# Patient Record
Sex: FEMALE | Race: WHITE | URBAN - METROPOLITAN AREA
[De-identification: names, ages, dates, MRNs, and addresses within clinical notes are randomized per-mention and may not be internally consistent; named-entity substitution may affect disease eponyms.]

---

## 2022-06-01 ENCOUNTER — TELEMEDICINE (OUTPATIENT)
Dept: RHEUMATOLOGY | Age: 52
End: 2022-06-01
Payer: COMMERCIAL

## 2022-06-01 DIAGNOSIS — G62.9 NEUROPATHY: ICD-10-CM

## 2022-06-01 DIAGNOSIS — M06.4 INFLAMMATORY POLYARTHRITIS (HCC): Primary | ICD-10-CM

## 2022-06-01 DIAGNOSIS — R89.4 SEROLOGIC ABNORMALITY: ICD-10-CM

## 2022-06-01 DIAGNOSIS — E55.9 HYPOVITAMINOSIS D: ICD-10-CM

## 2022-06-01 DIAGNOSIS — Z79.52 LONG TERM (CURRENT) USE OF SYSTEMIC STEROIDS: ICD-10-CM

## 2022-06-01 DIAGNOSIS — Z79.899 HIGH RISK MEDICATION USE: ICD-10-CM

## 2022-06-01 DIAGNOSIS — R76.0 ANTINUCLEAR ANTIBODY (ANA) TITER GREATER THAN 1:80: ICD-10-CM

## 2022-06-01 DIAGNOSIS — R53.83 OTHER FATIGUE: ICD-10-CM

## 2022-06-01 DIAGNOSIS — Z79.899 LONG TERM CURRENT USE OF IMMUNOSUPPRESSIVE DRUG: ICD-10-CM

## 2022-06-01 DIAGNOSIS — M54.16 RADICULOPATHY, LUMBAR REGION: ICD-10-CM

## 2022-06-01 DIAGNOSIS — D64.9 ANEMIA, UNSPECIFIED TYPE: ICD-10-CM

## 2022-06-01 PROCEDURE — 99215 OFFICE O/P EST HI 40 MIN: CPT | Performed by: INTERNAL MEDICINE

## 2022-06-01 RX ORDER — IBUPROFEN 400 MG/1
400 TABLET ORAL EVERY 8 HOURS PRN
COMMUNITY
Start: 2021-03-02 | End: 2022-08-31 | Stop reason: SINTOL

## 2022-06-01 RX ORDER — PANTOPRAZOLE SODIUM 40 MG/1
TABLET, DELAYED RELEASE ORAL
COMMUNITY
Start: 2021-03-21

## 2022-06-01 RX ORDER — PREGABALIN 50 MG/1
CAPSULE ORAL
Qty: 90 CAPSULE | Refills: 0 | Status: SHIPPED | OUTPATIENT
Start: 2022-06-01 | End: 2022-07-13 | Stop reason: SDUPTHER

## 2022-06-01 RX ORDER — HYDROCHLOROTHIAZIDE 25 MG/1
TABLET ORAL
COMMUNITY
Start: 2021-05-10

## 2022-06-01 RX ORDER — VENLAFAXINE HYDROCHLORIDE 150 MG/1
150 CAPSULE, EXTENDED RELEASE ORAL DAILY
Qty: 30 CAPSULE | Refills: 0 | Status: SHIPPED | OUTPATIENT
Start: 2022-06-01 | End: 2022-07-13 | Stop reason: SDUPTHER

## 2022-06-01 RX ORDER — AZATHIOPRINE 50 MG/1
150 TABLET ORAL DAILY
Qty: 30 TABLET | Refills: 1 | Status: SHIPPED | OUTPATIENT
Start: 2022-06-01 | End: 2022-08-31

## 2022-06-01 RX ORDER — PREDNISONE 1 MG/1
TABLET ORAL
Qty: 30 TABLET | Status: CANCELLED | OUTPATIENT
Start: 2022-06-01

## 2022-06-01 NOTE — PROGRESS NOTES
Alyssa Tran is a 47 y.o. female who was seen by synchronous (real-time) audio-video technology. Consent:  She and/or her healthcare decision maker is aware that this patient-initiated Telehealth encounter is a billable service, with coverage as determined by her insurance carrier. She is aware that she may receive a bill and has provided verbal consent to proceed: Yes    I was at home while conducting this encounter. Subjective:           History of Present Illness  Permanent History: Mrs dixon is a very pleasant 52Caucasian lady-year-old lady with past medical history of chronic pain, Sjogren's syndrome, GERD, anemia who presents for evaluation of her Sjogren's. Patient previously seeing dr Vin Badillo, for longstanding has several pain complaints, from bilateral shoulder and knee pain in addition to hands, no swelling or redness -was treated with tramadol - and takes it approximately once or twice daily. She was first diagnosed with Sjogren's many years ago when she had elevated serologies in addition to multiple pain complaints. Her anemia has been pretty much constant for the past few years at one time dropping so low she required iron infusions. She has been seen by Dr. Hector Edwards in the past with a colonoscopy that was negative., Patient was seen by me for a few years until my move has been followed locally since then. \"Update since then has been fairly quiescent symptoms for a couple of years but more recently increasing pain, stiffness, especially in the mornings with tingling numbness in the hands and feet. Also significant increasing pain in neck, was referred to surgeon underwent shots in the neck and lower back. Spinal surgeon referred to pain management. Neck and back pain and hand pain. Hands hurt all the time. Will wake up in the middle of the night with hand drawing. Seeing foot doctor, getting shots in feet cortisone, which does help for a few weeks.  Feet hurt when she walks, has a sharp shooting pain that goes up the legs and into back. Was thinking about going to chiropactor but scared. Has really bad stiffness in the morning and anytime she stands up when she is sittingl having heel soreness. For the past year, this pain is getting gradually worse. the owrst pain at ngiht and first thing in the morning. Not taking plaquneil. Just taking lyrica - takes 100mg at night- cant take during the day bc ot makes her sleepy. Tried gabapentin but it didn't help- was takingit at night. Works on her computer and having to stop bc of finger pain. Everything is tender. Taken steroids for feet, the foot doc, givne injectiosn and orally. Feels better when she takes sterods. Took 1 tab a day as needed for steroids, helped while she was taking it. Shooting pain down neck and into arms. Last period 2 years ago- done with menopause. Had to stop taking med for dryness bc it gave her hot flashes while she was in menopause. Sees cardio for heart- every 2 years- for htcz. Takes ibuprofen for pain. Takes tylenol, advil and rotates them- not at the same time. Takes vit d daily. Been taking effector for about a year bc of menopasue. Given prednisone by foot doc. Never smoked. Doesn't drink. \"    Family history significant for mother with Raynaud's disease      Since Last Visit,     Pt is currently taking  Lyrica 50mg in the am [id number] at night and Vitamin D every day, she is not taking the prednisone I makes her feel jittery, but she tolerating imuran. and helping some. The  pain in hands and feet are worse. Labs are in epic. ROS:     Musculoskeletal ROS:   .    Abnormal:  joint pain, joint swelling, back pain, stiff muscles, weak muscles and painful muscles  . AM stiffness (hours): 3 hours  . Pain scale (0-10): 6  . Fatigue scale (0-10): 8  .    Job status: working   .     Activities of daily living: some difficulty    positive as above with the addition of   Dry mouth, pedal edema, constipation, GERD symptoms,   Otherwise negative for:  Fevers, chills or sweats. Headaches  Weight  stable  No  scalp tenderness. No jaw claudication. No acute visual changes. No red or dry eyes. No auditory complaints. No nasal discharge or rhinorrhea. No oral lesions or ulcers. No sore throat. No tongue pain. No cough. No shortness of breath, dyspnea on exertion or wheezing. No hemoptysis. No chest pains or palpitations. No lightheadedness. No abdominal pain,diarrhea . No increased urinary frequency, nocturia, dysuria or changes in urine color. No alopecia, skin rashes/lesions. No changes in skin tightness. No Raynaud's. Photosensitivity. Current Outpatient Medications:     azaTHIOprine (Imuran) 50 mg tablet, Take 1 Tablet by mouth daily. , Disp: 90 Tablet, Rfl: 0    pregabalin (Lyrica) 50 mg capsule, Continue lyrica  50mg in am and 200mg in pm - can skip am dose if too sleepy, Disp: 150 Capsule, Rfl: 2    acetaminophen (Tylenol Extra Strength) 500 mg tablet, Take  by mouth every six (6) hours as needed for Pain., Disp: , Rfl:     hydroCHLOROthiazide (HYDRODIURIL) 25 mg tablet, , Disp: , Rfl:     pantoprazole (PROTONIX) 40 mg tablet, , Disp: , Rfl:     predniSONE (DELTASONE) 5 mg tablet, 5mg daily as needed (Patient not taking: Reported on 4/14/2022), Disp: 90 Tablet, Rfl: 0    venlafaxine-SR (EFFEXOR-XR) 75 mg capsule, , Disp: , Rfl:     Allergies   Allergen Reactions    Daypro [Oxaprozin] Swelling       There is no problem list on file for this patient. Past Medical History:   Diagnosis Date    Anemia     Cervical radiculopathy     Sjogren's disease ([facility] [address]        History reviewed. No pertinent surgical history. History reviewed. No pertinent family history.     Social History     Socioeconomic History    Marital status: SINGLE   Tobacco Use    Smoking status: Never Smoker    Smokeless tobacco: Never Used   Substance and Sexual Activity    Alcohol use: Not Currently Objective:          PHYSICAL EXAMINATION:     There were no vitals taken for this visit. General: alert, cooperative, no distress, Appears well-developed and well-nourished   HENT: Normocephalic, atraumatic   Mental  status: mental status: alert, oriented to person, place, and time, normal mood, behavior, speech, dress, motor activity, and thought processes   Resp: resp: normal effort and no respiratory distress   Neuro: neuro: no gross deficits - No Facial Asymmetry (Cranial nerve 7 motor function) (limited exam due to video visit)    Skin: skin: no discoloration or lesions of concern on visible areas   MSK; Normal gait with no signs of ataxia, Normal range of motion of neck     Due to this being a TeleHealth evaluation, many elements of the physical examination are unable to be assessed. Assessment:       67-year-old very pleasant  lady with past medical history of chronic pain, Sjogren's syndrome, GERD, anemia who presents for f/u of her Sjogren's - high SSA/SSB, mitochondrial antibodies. And REA. Previously stable on gabapentin, when necessary tramadol, and Plaquenil, however off Plaquenil for past few years. Clinical picture for few years fairly noninflammatory, however now looking more like inflammatory polyarthritis, much worse in the past year , dramatic response to steroids, last visit started steroid sparing agent with methotrexate, tolerating unclear significant improvement, called us a couple weeks ago for acute flare and we had called in a prednisone taper which helped. Failed methotrexate as close significant mouth sores and hair loss, now on Imuran tolerating slow improvement in symptoms can increase further as below. We will taper off prednisone fully. Mild elevated mitochondrial antibodies, and SSA- is followed by GI.     Mild anemia - Was seen by hematology, iron deficient given IV iron improved stable now    On replacement vit d               Treatment plan was discussed in detail with patient. Agreement and understanding of the plan was verbalized by the patient. Total visit time 44 minutes, greater than half of the time was spent on counseling. Plan:         1. Labs - cbc, cmp,  before next visit    2. Increase imuran to 150mg daily    3. Prednisone 5mg daily as needed - min use  4. Continue lyrica  50mg in am and 150mg in pm   5. effexor  150mg daily for now   6. Tylenol 1000mg every 6 hrs as needed for pain   6. RTC in  6 weeks  - call if any issues                         CPT Codes 29686-89773 for Established Patients may apply to this Telehealth Visit  Total visit time  45 minutes , greater than half of the time was spent on counseling. We discussed the expected course, resolution and complications of the diagnosis(es) in detail. Medication risks, benefits, costs, interactions, and alternatives were discussed as indicated. I advised her to contact the office if her condition worsens, changes or fails to improve as anticipated. She expressed understanding with the diagnosis(es) and plan. Pursuant to the emergency declaration under the [facility] waiver authority and the [employer] General Act, this Virtual  Visit was conducted, with patient's consent, to reduce the patient's risk of exposure to COVID-19 and provide continuity of care for an established patient. Services were provided through a video synchronous discussion virtually to substitute for in-person clinic visit.     Tia Daniel MD

## 2022-07-13 ENCOUNTER — OFFICE VISIT (OUTPATIENT)
Dept: RHEUMATOLOGY | Age: 52
End: 2022-07-13
Payer: COMMERCIAL

## 2022-07-13 VITALS
WEIGHT: 133 LBS | DIASTOLIC BLOOD PRESSURE: 94 MMHG | SYSTOLIC BLOOD PRESSURE: 156 MMHG | BODY MASS INDEX: 24.33 KG/M2 | HEART RATE: 73 BPM

## 2022-07-13 DIAGNOSIS — M06.4 INFLAMMATORY POLYARTHRITIS (HCC): ICD-10-CM

## 2022-07-13 DIAGNOSIS — R53.83 OTHER FATIGUE: ICD-10-CM

## 2022-07-13 DIAGNOSIS — G62.9 NEUROPATHY: ICD-10-CM

## 2022-07-13 DIAGNOSIS — R89.4 SEROLOGIC ABNORMALITY: ICD-10-CM

## 2022-07-13 DIAGNOSIS — R76.0 ANTINUCLEAR ANTIBODY (ANA) TITER GREATER THAN 1:80: ICD-10-CM

## 2022-07-13 DIAGNOSIS — Z79.899 HIGH RISK MEDICATION USE: ICD-10-CM

## 2022-07-13 DIAGNOSIS — M54.16 RADICULOPATHY, LUMBAR REGION: ICD-10-CM

## 2022-07-13 DIAGNOSIS — D64.9 ANEMIA, UNSPECIFIED TYPE: ICD-10-CM

## 2022-07-13 DIAGNOSIS — E55.9 HYPOVITAMINOSIS D: ICD-10-CM

## 2022-07-13 DIAGNOSIS — Z79.52 LONG TERM (CURRENT) USE OF SYSTEMIC STEROIDS: ICD-10-CM

## 2022-07-13 DIAGNOSIS — Z79.899 LONG TERM CURRENT USE OF IMMUNOSUPPRESSIVE DRUG: ICD-10-CM

## 2022-07-13 PROCEDURE — 99215 OFFICE O/P EST HI 40 MIN: CPT | Performed by: INTERNAL MEDICINE

## 2022-07-13 RX ORDER — VENLAFAXINE HYDROCHLORIDE 150 MG/1
150 CAPSULE, EXTENDED RELEASE ORAL DAILY
Qty: 90 CAPSULE | Refills: 0 | Status: SHIPPED | OUTPATIENT
Start: 2022-07-13 | End: 2022-08-31 | Stop reason: SDUPTHER

## 2022-07-13 RX ORDER — PREGABALIN 50 MG/1
CAPSULE ORAL
Qty: 360 CAPSULE | Refills: 0 | Status: SHIPPED | OUTPATIENT
Start: 2022-07-13 | End: 2022-08-31 | Stop reason: SDUPTHER

## 2022-07-13 NOTE — PROGRESS NOTES
Subjective:           History of Present Illness  Permanent History: Mrs dixon is a very pleasant 47  lady-year-old lady with past medical history of chronic pain, Sjogren's syndrome, GERD, anemia who presents for evaluation of her Sjogren's. Patient previously seeing dr Bulmaro Reyes, for longstanding has several pain complaints, from bilateral shoulder and knee pain in addition to hands, no swelling or redness -was treated with tramadol - and takes it approximately once or twice daily. She was first diagnosed with Sjogren's many years ago when she had elevated serologies in addition to multiple pain complaints. Her anemia has been pretty much constant for the past few years at one time dropping so low she required iron infusions. She has been seen by Dr. Marleny Burton in the past with a colonoscopy that was negative., Patient was seen by me for a few years until my move has been followed locally since then. \"Update since then has been fairly quiescent symptoms for a couple of years but more recently increasing pain, stiffness, especially in the mornings with tingling numbness in the hands and feet. Also significant increasing pain in neck, was referred to surgeon underwent shots in the neck and lower back. Spinal surgeon referred to pain management. Neck and back pain and hand pain. Hands hurt all the time. Will wake up in the middle of the night with hand drawing. Seeing foot doctor, getting shots in feet cortisone, which does help for a few weeks. Feet hurt when she walks, has a sharp shooting pain that goes up the legs and into back. Was thinking about going to chiropactor but scared. Has really bad stiffness in the morning and anytime she stands up when she is sittingl having heel soreness. For the past year, this pain is getting gradually worse. the owrst pain at ngiht and first thing in the morning. Not taking plaquneil.  Just taking lyrica - takes 100mg at night- cant take during the day bc ot makes her sleepy. Tried gabapentin but it didn't help- was takingit at night. Works on her computer and having to stop bc of finger pain. Everything is tender. Taken steroids for feet, the foot doc, givne injectiosn and orally. Feels better when she takes sterods. Took 1 tab a day as needed for steroids, helped while she was taking it. Shooting pain down neck and into arms. Last period 2 years ago- done with menopause. Had to stop taking med for dryness bc it gave her hot flashes while she was in menopause. Sees cardio for heart- every 2 years- for htcz. Takes ibuprofen for pain. Takes tylenol, advil and rotates them- not at the same time. Takes vit d daily. Been taking effector for about a year bc of menopasue. Given prednisone by foot doc. Never smoked. Doesn't drink. \"    Family history significant for mother with Raynaud's disease      Since Last Visit,     Pt is currently taking  Lyrica 50mg in the am [id number] at night and Vitamin D 2000 every day, Imuran 150mg qd. Not on Pred. Worst pain hands,feet,neck,back and arm- rt side worse. Labs are in Care. Pt staes last 3 weeks having stomach pain,belching, cant eat good, going to GI - Dr Willem Yeboah  in 3 weeks. Pt also states she has also been really depressed. ROS:     Musculoskeletal ROS:   .    Abnormal:  joint pain, joint swelling, back pain, neck painstiff muscles, weak muscles and painful muscles  . AM stiffness (hours): 2 hours  . Pain scale (0-10): 6  . Fatigue scale (0-10): 5  .    Job status: working   . Activities of daily living: some difficulty    positive as above with the addition of   Dry mouth, pedal edema, constipation, GERD symptoms,   Otherwise negative for:  Fevers, chills or sweats. Headaches  Weight  stable  No  scalp tenderness. No jaw claudication. No acute visual changes. No red or dry eyes. No auditory complaints. No nasal discharge or rhinorrhea. No oral lesions or ulcers. No sore throat. No tongue pain. No cough.  No shortness of breath, dyspnea on exertion or wheezing. No hemoptysis. No chest pains or palpitations. No lightheadedness. No abdominal pain,diarrhea . No increased urinary frequency, nocturia, dysuria or changes in urine color. No alopecia, skin rashes/lesions. No changes in skin tightness. No Raynaud's. Photosensitivity. Current Outpatient Medications:     azaTHIOprine (Imuran) 50 mg tablet, Take 1 Tablet by mouth daily. , Disp: 90 Tablet, Rfl: 0    pregabalin (Lyrica) 50 mg capsule, Continue lyrica  50mg in am and 200mg in pm - can skip am dose if too sleepy, Disp: 150 Capsule, Rfl: 2    acetaminophen (Tylenol Extra Strength) 500 mg tablet, Take  by mouth every six (6) hours as needed for Pain., Disp: , Rfl:     hydroCHLOROthiazide (HYDRODIURIL) 25 mg tablet, , Disp: , Rfl:     pantoprazole (PROTONIX) 40 mg tablet, , Disp: , Rfl:     predniSONE (DELTASONE) 5 mg tablet, 5mg daily as needed (Patient not taking: Reported on 4/14/2022), Disp: 90 Tablet, Rfl: 0    venlafaxine-SR (EFFEXOR-XR) 75 mg capsule, , Disp: , Rfl:     Allergies   Allergen Reactions    Daypro [Oxaprozin] Swelling       There is no problem list on file for this patient. Past Medical History:   Diagnosis Date    Anemia     Cervical radiculopathy     Sjogren's disease ([facility] [address]        History reviewed. No pertinent surgical history. History reviewed. No pertinent family history.     Social History     Socioeconomic History    Marital status: SINGLE   Tobacco Use    Smoking status: Never Smoker    Smokeless tobacco: Never Used   Substance and Sexual Activity    Alcohol use: Not Currently     Objective:         Vitals:    07/13/22 1143   BP: (!) 156/94   Pulse: 73        Physical Exam  General: alert, healthy, well nourished, pleasant, no acute distress  HEENT: Some dry mucous membranes, no oral ulcers  Lungs: clear to auscultation bilaterally without wheezes, rales or rhonchi  Heart: regular rate & rhythm, +S1, +S2, no murmurs  Extremities: no clubbing, cyanosis, or edema  Neurological mood focal deficits conversing fluently. MUSCULOSKELETAL:   -Hands: some diffused TTP throughout mcp , pip, no synovitis   -Wrists: Persistent tenderness bilaterally but no swelling or fullness today  -Elbows: normal   -Shoulders: Slow but full range of motion due to pain  -Neck: normal   -Back:point tenderness of the low back   -Hips: normal   -Knees: normal   -Ankles: Bilateral tenderness  -Feet: Bilateral tenderness    -Swollen Joint Count: 0  -Tender Joint Count: 6        Assessment:       58-year-old very pleasant  lady with past medical history of chronic pain, Sjogren's syndrome, GERD, anemia who presents for f/u of her Sjogren's - high SSA/SSB, mitochondrial antibodies. And REA. Previously stable on gabapentin, when necessary tramadol, and Plaquenil, however off Plaquenil for past few years. Clinical picture for few years fairly noninflammatory, however now looking more like inflammatory polyarthritis, much worse in the past year , dramatic response to steroids, last visit started steroid sparing agent with methotrexate, tolerating unclear significant improvement, called us a couple weeks ago for acute flare and we had called in a prednisone taper which helped. Failed methotrexate as close significant mouth sores and hair loss, now on Imuran tolerating slow improvement in symptoms -is currently on 150 mg daily, today however is having worsening fatigue overall malaise and is not sure if Imuran really is helping or not therefore recommend trial of stopping if symptoms much worse likely was doing more than he realized. Labs otherwise stable    Mild elevated mitochondrial antibodies, and SSA- is followed by GI. Mild anemia - Was seen by hematology, iron deficient given IV iron improved stable now    On replacement vit d               Treatment plan was discussed in detail with patient.  Agreement and understanding of the plan was verbalized by the patient. Total visit time 42 minutes, greater than half of the time was spent on counseling. Plan:         1.    effexor  150mg daily for now   2. Trial stopping imuran for now   3. No Prednisone for now   4. Continue lyrica  50mg in am and 150mg in pm   5. Tylenol 1000mg every 6 hrs as needed for pain  6. RTC in  6 weeks  - call if any issues                         CPT Codes 15957-98960 for Established Patients may apply to this Telehealth Visit  Total visit time  45 minutes , greater than half of the time was spent on counseling. We discussed the expected course, resolution and complications of the diagnosis(es) in detail. Medication risks, benefits, costs, interactions, and alternatives were discussed as indicated. I advised her to contact the office if her condition worsens, changes or fails to improve as anticipated. She expressed understanding with the diagnosis(es) and plan. Pursuant to the emergency declaration under the [facility] waiver authority and the [employer] General Act, this Virtual  Visit was conducted, with patient's consent, to reduce the patient's risk of exposure to COVID-19 and provide continuity of care for an established patient. Services were provided through a video synchronous discussion virtually to substitute for in-person clinic visit.     Yasmany Clifton MD

## 2022-07-13 NOTE — PATIENT INSTRUCTIONS
1.    effexor  150mg daily for now   2. Trial stopping imuran for now   3. No Prednisone for now   4. Continue lyrica  50mg in am and 150mg in pm   5. Tylenol 1000mg every 6 hrs as needed for pain  6.  RTC in  6 weeks  - call if any issues

## 2022-08-09 ENCOUNTER — TELEPHONE (OUTPATIENT)
Dept: RHEUMATOLOGY | Age: 52
End: 2022-08-09

## 2022-08-09 NOTE — TELEPHONE ENCOUNTER
Dx Inflam Arth, Seen 7/13, Appt 8/31she was having GI issues so Imuran was stopped and she did follow up with GI however she states her stomach issues havent changed and also she cant taste anything and couldn't at last visit, could the Imuran cause any issues with taste? Should she start back since nothing has changed?

## 2022-08-31 ENCOUNTER — TELEMEDICINE (OUTPATIENT)
Dept: RHEUMATOLOGY | Age: 52
End: 2022-08-31
Payer: COMMERCIAL

## 2022-08-31 DIAGNOSIS — Z79.899 LONG TERM CURRENT USE OF IMMUNOSUPPRESSIVE DRUG: ICD-10-CM

## 2022-08-31 DIAGNOSIS — G62.9 NEUROPATHY: ICD-10-CM

## 2022-08-31 DIAGNOSIS — R76.0 ANTINUCLEAR ANTIBODY (ANA) TITER GREATER THAN 1:80: ICD-10-CM

## 2022-08-31 DIAGNOSIS — M54.16 RADICULOPATHY, LUMBAR REGION: ICD-10-CM

## 2022-08-31 DIAGNOSIS — R53.83 OTHER FATIGUE: ICD-10-CM

## 2022-08-31 DIAGNOSIS — E55.9 HYPOVITAMINOSIS D: ICD-10-CM

## 2022-08-31 DIAGNOSIS — M06.4 INFLAMMATORY POLYARTHRITIS (HCC): ICD-10-CM

## 2022-08-31 DIAGNOSIS — R89.4 SEROLOGIC ABNORMALITY: ICD-10-CM

## 2022-08-31 DIAGNOSIS — Z79.52 LONG TERM (CURRENT) USE OF SYSTEMIC STEROIDS: ICD-10-CM

## 2022-08-31 DIAGNOSIS — Z79.899 HIGH RISK MEDICATION USE: ICD-10-CM

## 2022-08-31 DIAGNOSIS — D64.9 ANEMIA, UNSPECIFIED TYPE: ICD-10-CM

## 2022-08-31 PROCEDURE — 99214 OFFICE O/P EST MOD 30 MIN: CPT | Performed by: INTERNAL MEDICINE

## 2022-08-31 RX ORDER — PAROXETINE 10 MG/1
10 TABLET, FILM COATED ORAL EVERY MORNING
COMMUNITY
Start: 2022-08-22 | End: 2023-08-22

## 2022-08-31 RX ORDER — PREGABALIN 50 MG/1
CAPSULE ORAL
Qty: 360 CAPSULE | Refills: 0 | Status: SHIPPED | OUTPATIENT
Start: 2022-08-31 | End: 2022-10-19 | Stop reason: SDUPTHER

## 2022-08-31 RX ORDER — VENLAFAXINE HYDROCHLORIDE 150 MG/1
150 CAPSULE, EXTENDED RELEASE ORAL DAILY
Qty: 90 CAPSULE | Refills: 0 | Status: SHIPPED | OUTPATIENT
Start: 2022-08-31 | End: 2022-10-19 | Stop reason: SDUPTHER

## 2022-08-31 NOTE — PROGRESS NOTES
Gisela Alatorre is a 47 y.o. female who was seen by synchronous (real-time) audio-video technology. Consent:  She and/or her healthcare decision maker is aware that this patient-initiated Telehealth encounter is a billable service, with coverage as determined by her insurance carrier. She is aware that she may receive a bill and has provided verbal consent to proceed: Yes    I was at home while conducting this encounter. Subjective:           History of Present Illness  Permanent History: Mrs dixon is a very pleasant 47  lady-year-old lady with past medical history of chronic pain, Sjogren's syndrome, GERD, anemia who presents for evaluation of her Sjogren's. Patient previously seeing dr Ángel Seaman, for longstanding has several pain complaints, from bilateral shoulder and knee pain in addition to hands, no swelling or redness -was treated with tramadol - and takes it approximately once or twice daily. She was first diagnosed with Sjogren's many years ago when she had elevated serologies in addition to multiple pain complaints. Her anemia has been pretty much constant for the past few years at one time dropping so low she required iron infusions. She has been seen by Dr. Sharifa Pantoja in the past with a colonoscopy that was negative., Patient was seen by me for a few years until my move has been followed locally since then. \"Update since then has been fairly quiescent symptoms for a couple of years but more recently increasing pain, stiffness, especially in the mornings with tingling numbness in the hands and feet. Also significant increasing pain in neck, was referred to surgeon underwent shots in the neck and lower back. Spinal surgeon referred to pain management. Neck and back pain and hand pain. Hands hurt all the time. Will wake up in the middle of the night with hand drawing. Seeing foot doctor, getting shots in feet cortisone, which does help for a few weeks.  Feet hurt when she working   . Activities of daily living: some difficulty    positive as above with the addition of   Dry mouth, pedal edema, constipation, GERD symptoms,   Otherwise negative for:  Fevers, chills or sweats. Headaches  Weight  stable  No  scalp tenderness. No jaw claudication. No acute visual changes. No red or dry eyes. No auditory complaints. No nasal discharge or rhinorrhea. No oral lesions or ulcers. No sore throat. No tongue pain. No cough. No shortness of breath, dyspnea on exertion or wheezing. No hemoptysis. No chest pains or palpitations. No lightheadedness. No abdominal pain,diarrhea . No increased urinary frequency, nocturia, dysuria or changes in urine color. No alopecia, skin rashes/lesions. No changes in skin tightness. No Raynaud's. Photosensitivity. Current Outpatient Medications:     azaTHIOprine (Imuran) 50 mg tablet, Take 1 Tablet by mouth daily. , Disp: 90 Tablet, Rfl: 0    pregabalin (Lyrica) 50 mg capsule, Continue lyrica  50mg in am and 200mg in pm - can skip am dose if too sleepy, Disp: 150 Capsule, Rfl: 2    acetaminophen (Tylenol Extra Strength) 500 mg tablet, Take  by mouth every six (6) hours as needed for Pain., Disp: , Rfl:     hydroCHLOROthiazide (HYDRODIURIL) 25 mg tablet, , Disp: , Rfl:     pantoprazole (PROTONIX) 40 mg tablet, , Disp: , Rfl:     predniSONE (DELTASONE) 5 mg tablet, 5mg daily as needed (Patient not taking: Reported on 4/14/2022), Disp: 90 Tablet, Rfl: 0    venlafaxine-SR (EFFEXOR-XR) 75 mg capsule, , Disp: , Rfl:     Allergies   Allergen Reactions    Daypro [Oxaprozin] Swelling       There is no problem list on file for this patient. Past Medical History:   Diagnosis Date    Anemia     Cervical radiculopathy     Sjogren's disease ([facility] [address]        History reviewed. No pertinent surgical history. History reviewed. No pertinent family history.     Social History     Socioeconomic History    Marital status: SINGLE   Tobacco Use    Smoking status: Never Smoker    Smokeless tobacco: Never Used   Substance and Sexual Activity    Alcohol use: Not Currently            Objective:          PHYSICAL EXAMINATION:     There were no vitals taken for this visit. General: alert, cooperative, no distress, Appears well-developed and well-nourished   HENT: Normocephalic, atraumatic   Mental  status: mental status: alert, oriented to person, place, and time, normal mood, behavior, speech, dress, motor activity, and thought processes   Resp: resp: normal effort and no respiratory distress   Neuro: neuro: no gross deficits - No Facial Asymmetry (Cranial nerve 7 motor function) (limited exam due to video visit)    Skin: skin: no discoloration or lesions of concern on visible areas   MSK; Normal gait with no signs of ataxia, Normal range of motion of neck     Due to this being a TeleHealth evaluation, many elements of the physical examination are unable to be assessed. Assessment:       79-year-old very pleasant  lady with past medical history of chronic pain, Sjogren's syndrome, GERD, anemia who presents for f/u of her Sjogren's - high SSA/SSB, mitochondrial antibodies. And REA. Previously stable on gabapentin, when necessary tramadol, and Plaquenil, however off Plaquenil for past few years. Clinical picture for few years fairly noninflammatory, however now looking more like inflammatory polyarthritis, much worse in the past year , dramatic response to steroids, last visit started steroid sparing agent with methotrexate, tolerating unclear significant improvement, called us a couple weeks ago for acute flare and we had called in a prednisone taper which helped.   Failed methotrexate as close significant mouth sores and hair loss, now on Imuran tolerating slow improvement in symptoms -is currently on 150 mg daily, today however is having worsening fatigue overall malaise, last visit we decided to hold Imuran to see if symptoms really improved or worsened, some worsening today however still undergoing work-up with GI therefore defer resuming till after that and we can reassess. Mild elevated mitochondrial antibodies, and SSA- is followed by GI. Mild anemia - Was seen by hematology, iron deficient given IV iron improved stable now    On replacement vit d               Treatment plan was discussed in detail with patient. Agreement and understanding of the plan was verbalized by the patient. Total visit time 38 minutes, greater than half of the time was spent on counseling. Plan:         1.    effexor  150mg daily for now   2. Continue lyrica  50mg in am and 150mg in pm  3. Will fu after colonoscopy   4. Tylenol 1000mg every 6 hrs as needed for pain  5. RTC in  6-8 weeks  - call if any issues                         CPT Codes 91582-76359 for Established Patients may apply to this Telehealth Visit  Total visit time  45 minutes , greater than half of the time was spent on counseling. We discussed the expected course, resolution and complications of the diagnosis(es) in detail. Medication risks, benefits, costs, interactions, and alternatives were discussed as indicated. I advised her to contact the office if her condition worsens, changes or fails to improve as anticipated. She expressed understanding with the diagnosis(es) and plan. Pursuant to the emergency declaration under the [facility] waiver authority and the [employer] General Act, this Virtual  Visit was conducted, with patient's consent, to reduce the patient's risk of exposure to COVID-19 and provide continuity of care for an established patient. Services were provided through a video synchronous discussion virtually to substitute for in-person clinic visit.     Fatou Fuentes MD

## 2022-08-31 NOTE — PATIENT INSTRUCTIONS
1.    effexor  150mg daily for now   2. Continue lyrica  50mg in am and 150mg in pm  3. Will fu after colonoscopy   4. Tylenol 1000mg every 6 hrs as needed for pain  5.     RTC in  6-8 weeks  - call if any issues

## 2022-10-19 ENCOUNTER — TELEMEDICINE (OUTPATIENT)
Dept: RHEUMATOLOGY | Age: 52
End: 2022-10-19
Payer: COMMERCIAL

## 2022-10-19 DIAGNOSIS — R89.4 SEROLOGIC ABNORMALITY: ICD-10-CM

## 2022-10-19 DIAGNOSIS — Z79.52 LONG TERM (CURRENT) USE OF SYSTEMIC STEROIDS: ICD-10-CM

## 2022-10-19 DIAGNOSIS — R76.0 ANTINUCLEAR ANTIBODY (ANA) TITER GREATER THAN 1:80: ICD-10-CM

## 2022-10-19 DIAGNOSIS — G62.9 NEUROPATHY: ICD-10-CM

## 2022-10-19 DIAGNOSIS — Z79.899 LONG TERM CURRENT USE OF IMMUNOSUPPRESSIVE DRUG: ICD-10-CM

## 2022-10-19 DIAGNOSIS — Z79.899 HIGH RISK MEDICATION USE: ICD-10-CM

## 2022-10-19 DIAGNOSIS — E55.9 HYPOVITAMINOSIS D: ICD-10-CM

## 2022-10-19 DIAGNOSIS — M54.16 RADICULOPATHY, LUMBAR REGION: ICD-10-CM

## 2022-10-19 DIAGNOSIS — M06.4 INFLAMMATORY POLYARTHRITIS (HCC): ICD-10-CM

## 2022-10-19 DIAGNOSIS — D64.9 ANEMIA, UNSPECIFIED TYPE: ICD-10-CM

## 2022-10-19 DIAGNOSIS — R53.83 OTHER FATIGUE: ICD-10-CM

## 2022-10-19 PROCEDURE — 99215 OFFICE O/P EST HI 40 MIN: CPT | Performed by: INTERNAL MEDICINE

## 2022-10-19 RX ORDER — SUCRALFATE 1 G/1
TABLET ORAL 4 TIMES DAILY
COMMUNITY
Start: 2022-09-28

## 2022-10-19 RX ORDER — VENLAFAXINE HYDROCHLORIDE 150 MG/1
150 CAPSULE, EXTENDED RELEASE ORAL DAILY
Qty: 90 CAPSULE | Refills: 0 | Status: SHIPPED | OUTPATIENT
Start: 2022-10-19

## 2022-10-19 RX ORDER — PREGABALIN 50 MG/1
CAPSULE ORAL
Qty: 360 CAPSULE | Refills: 0 | Status: SHIPPED | OUTPATIENT
Start: 2022-10-19 | End: 2023-10-19

## 2022-10-19 RX ORDER — FOLIC ACID 1 MG/1
1 TABLET ORAL DAILY
Qty: 90 TABLET | Refills: 0 | Status: SHIPPED | OUTPATIENT
Start: 2022-10-19

## 2022-10-19 NOTE — PROGRESS NOTES
Thompson Begum is a 47 y.o. female who was seen by synchronous (real-time) audio-video technology. Consent:  She and/or her healthcare decision maker is aware that this patient-initiated Telehealth encounter is a billable service, with coverage as determined by her insurance carrier. She is aware that she may receive a bill and has provided verbal consent to proceed: Yes    I was at home while conducting this encounter. Subjective:           History of Present Illness  Permanent History: Mrs dixon is a very pleasant 47  lady-year-old lady with past medical history of chronic pain, Sjogren's syndrome, GERD, anemia who presents for evaluation of her Sjogren's. Patient previously seeing dr Esther Uriarte, for longstanding has several pain complaints, from bilateral shoulder and knee pain in addition to hands, no swelling or redness -was treated with tramadol - and takes it approximately once or twice daily. She was first diagnosed with Sjogren's many years ago when she had elevated serologies in addition to multiple pain complaints. Her anemia has been pretty much constant for the past few years at one time dropping so low she required iron infusions. She has been seen by Dr. Aishwarya Gibson in the past with a colonoscopy that was negative., Patient was seen by me for a few years until my move has been followed locally since then. \"Update since then has been fairly quiescent symptoms for a couple of years but more recently increasing pain, stiffness, especially in the mornings with tingling numbness in the hands and feet. Also significant increasing pain in neck, was referred to surgeon underwent shots in the neck and lower back. Spinal surgeon referred to pain management. Neck and back pain and hand pain. Hands hurt all the time. Will wake up in the middle of the night with hand drawing. Seeing foot doctor, getting shots in feet cortisone, which does help for a few weeks.  Feet hurt when she Otherwise negative for:  Fevers, chills or sweats. Headaches  Weight  stable  No  scalp tenderness. No jaw claudication. No acute visual changes. No red or dry eyes. No auditory complaints. No nasal discharge or rhinorrhea. No oral lesions or ulcers. No sore throat. No tongue pain. No cough. No shortness of breath, dyspnea on exertion or wheezing. No hemoptysis. No chest pains or palpitations. No lightheadedness. No abdominal pain,diarrhea . No increased urinary frequency, nocturia, dysuria or changes in urine color. No alopecia, skin rashes/lesions. No changes in skin tightness. No Raynaud's. Photosensitivity. Current Outpatient Medications:     azaTHIOprine (Imuran) 50 mg tablet, Take 1 Tablet by mouth daily. , Disp: 90 Tablet, Rfl: 0    pregabalin (Lyrica) 50 mg capsule, Continue lyrica  50mg in am and 200mg in pm - can skip am dose if too sleepy, Disp: 150 Capsule, Rfl: 2    acetaminophen (Tylenol Extra Strength) 500 mg tablet, Take  by mouth every six (6) hours as needed for Pain., Disp: , Rfl:     hydroCHLOROthiazide (HYDR a worker in her [employer] to see ODSTACYURIL) 25 mg tablet, , Disp: , Rfl:     pantoprazole (PROTONIX) 40 mg tablet, , Disp: , Rfl:     predniSONE (DELTASONE) 5 mg tablet, 5mg daily as needed (Patient not taking: Reported on 4/14/2022), Disp: 90 Tablet, Rfl: 0    venlafaxine-SR (EFFEXOR-XR) 75 mg capsule, , Disp: , Rfl:     Allergies   Allergen Reactions    Daypro [Oxaprozin] Swelling       There is no problem list on file for this patient. Past Medical History:   Diagnosis Date    Anemia     Cervical radiculopathy     Sjogren's disease ([facility] [address]        History reviewed. No pertinent surgical history. History reviewed. No pertinent family history.     Social History     Socioeconomic History    Marital status: SINGLE   Tobacco Use    Smoking status: Never Smoker    Smokeless tobacco: Never Used   Substance and Sexual Activity    Alcohol use: Not Currently Objective:          PHYSICAL EXAMINATION:     There were no vitals taken for this visit. General: alert, cooperative, no distress, Appears well-developed and well-nourished   HENT: Normocephalic, atraumatic   Mental  status: mental status: alert, oriented to person, place, and time, normal mood, behavior, speech, dress, motor activity, and thought processes   Resp: resp: normal effort and no respiratory distress   Neuro: neuro: no gross deficits - No Facial Asymmetry (Cranial nerve 7 motor function) (limited exam due to video visit)    Skin: skin: no discoloration or lesions of concern on visible areas   MSK; Normal gait with no signs of ataxia, Normal range of motion of neck     Due to this being a TeleHealth evaluation, many elements of the physical examination are unable to be assessed. Assessment:       69-year-old very pleasant  lady with past medical history of chronic pain, Sjogren's syndrome, GERD, anemia who presents for f/u of her Sjogren's - high SSA/SSB, mitochondrial antibodies. And REA. Previously stable on gabapentin, when necessary tramadol, and Plaquenil, however off Plaquenil for past few years. Clinical picture for few years fairly noninflammatory, however now looking more like inflammatory polyarthritis, much worse in the past year , dramatic response to steroids, last visit started steroid sparing agent with methotrexate, tolerating unclear significant improvement, called us a couple weeks ago for acute flare and we had called in a prednisone taper which helped. Failed methotrexate as close significant mouth sores and hair loss, now on Imuran tolerating slow improvement in symptoms -is currently on 150 mg daily, held last visit due to GI distress, recently worked up by GI, shows extensive inflammatory changes on Carafate, recommend holding off resuming Imuran until Carafate therapy complete.   Symptoms otherwise not significantly worse off Imuran are manageable with current regimen. Mild elevated mitochondrial antibodies, and SSA- is followed by GI. Mild anemia - Was seen by hematology, iron deficient given IV iron improved stable now    On replacement vit d               Treatment plan was discussed in detail with patient. Agreement and understanding of the plan was verbalized by the patient. Total visit time 41 minutes, greater than half of the time was spent on counseling. Plan:         1.    effexor  150mg daily for now   2. Continue lyrica  50mg in am and 150mg in pm  3. Will fu after colonoscopy   4. Tylenol 1000mg every 6 hrs as needed for pain  5. Folic acid 1 mg daily  6. RTC in 2 months- call if any issues  7. Flu shot is updated       CPT Codes 02791-72786 for Established Patients may apply to this Telehealth Visit  Total visit time  41 minutes , greater than half of the time was spent on counseling. We discussed the expected course, resolution and complications of the diagnosis(es) in detail. Medication risks, benefits, costs, interactions, and alternatives were discussed as indicated. I advised her to contact the office if her condition worsens, changes or fails to improve as anticipated. She expressed understanding with the diagnosis(es) and plan. Pursuant to the emergency declaration under the [address] [facility] waiver authority and the [employer] General Act, this Virtual  Visit was conducted, with patient's consent, to reduce the patient's risk of exposure to COVID-19 and provide continuity of care for an established patient. Services were provided through a video synchronous discussion virtually to substitute for in-person clinic visit.     Vernon Patel MD

## 2022-10-19 NOTE — PATIENT INSTRUCTIONS
1.    effexor  150mg daily for now   2. Continue lyrica  50mg in am and 150mg in pm  3. Will fu after colonoscopy   4. Tylenol 1000mg every 6 hrs as needed for pain  5. Folic acid 1 mg daily  6. RTC in 2 months- call if any issues  7.   Flu shot is updated

## 2022-12-20 ENCOUNTER — OFFICE VISIT (OUTPATIENT)
Dept: RHEUMATOLOGY | Age: 52
End: 2022-12-20
Payer: COMMERCIAL

## 2022-12-20 VITALS
HEART RATE: 80 BPM | DIASTOLIC BLOOD PRESSURE: 91 MMHG | BODY MASS INDEX: 23.96 KG/M2 | SYSTOLIC BLOOD PRESSURE: 147 MMHG | WEIGHT: 131 LBS

## 2022-12-20 DIAGNOSIS — Z79.899 LONG TERM CURRENT USE OF IMMUNOSUPPRESSIVE DRUG: ICD-10-CM

## 2022-12-20 DIAGNOSIS — M06.4 INFLAMMATORY POLYARTHRITIS (HCC): Primary | ICD-10-CM

## 2022-12-20 DIAGNOSIS — R76.0 ANTINUCLEAR ANTIBODY (ANA) TITER GREATER THAN 1:80: ICD-10-CM

## 2022-12-20 DIAGNOSIS — G62.9 NEUROPATHY: ICD-10-CM

## 2022-12-20 DIAGNOSIS — D64.9 ANEMIA, UNSPECIFIED TYPE: ICD-10-CM

## 2022-12-20 DIAGNOSIS — R89.4 SEROLOGIC ABNORMALITY: ICD-10-CM

## 2022-12-20 DIAGNOSIS — R53.83 OTHER FATIGUE: ICD-10-CM

## 2022-12-20 DIAGNOSIS — Z79.52 LONG TERM (CURRENT) USE OF SYSTEMIC STEROIDS: ICD-10-CM

## 2022-12-20 DIAGNOSIS — Z79.899 HIGH RISK MEDICATION USE: ICD-10-CM

## 2022-12-20 DIAGNOSIS — M54.16 RADICULOPATHY, LUMBAR REGION: ICD-10-CM

## 2022-12-20 DIAGNOSIS — E55.9 HYPOVITAMINOSIS D: ICD-10-CM

## 2022-12-20 PROCEDURE — 99215 OFFICE O/P EST HI 40 MIN: CPT | Performed by: INTERNAL MEDICINE

## 2022-12-20 NOTE — PATIENT INSTRUCTIONS
1.    effexor  150mg daily for now   2. Continue lyrica  50mg in am and 150mg in pm  3. Start mtx 10mg SQ once weekly with daily folic acid 2mg   4. Tylenol 1000mg every 6 hrs as needed for pain  5. Salagen 5mg upto every 3 times a day as needed   6. RTC in 2 months- call if any issues - with cbc, cmp   7.  Fu with GI

## 2022-12-20 NOTE — PROGRESS NOTES
Subjective:           History of Present Illness  Permanent History: Mrs dixon is a very pleasant 47  lady-year-old lady with past medical history of chronic pain, Sjogren's syndrome, GERD, anemia who presents for evaluation of her Sjogren's. Patient previously seeing dr Jose Aguilar, for longstanding has several pain complaints, from bilateral shoulder and knee pain in addition to hands, no swelling or redness -was treated with tramadol - and takes it approximately once or twice daily. She was first diagnosed with Sjogren's many years ago when she had elevated serologies in addition to multiple pain complaints. Her anemia has been pretty much constant for the past few years at one time dropping so low she required iron infusions. She has been seen by Dr. Ramo Jimenez in the past with a colonoscopy that was negative., Patient was seen by me for a few years until my move has been followed locally since then. \"Update since then has been fairly quiescent symptoms for a couple of years but more recently increasing pain, stiffness, especially in the mornings with tingling numbness in the hands and feet. Also significant increasing pain in neck, was referred to surgeon underwent shots in the neck and lower back. Spinal surgeon referred to pain management. Neck and back pain and hand pain. Hands hurt all the time. Will wake up in the middle of the night with hand drawing. Seeing foot doctor, getting shots in feet cortisone, which does help for a few weeks. Feet hurt when she walks, has a sharp shooting pain that goes up the legs and into back. Was thinking about going to chiropactor but scared. Has really bad stiffness in the morning and anytime she stands up when she is sittingl having heel soreness. For the past year, this pain is getting gradually worse. the owrst pain at ngiht and first thing in the morning. Not taking plaquneil.  Just taking lyrica - takes 100mg at night- cant take during the day bc ot makes her sleepy. Tried gabapentin but it didn't help- was takingit at night. Works on her computer and having to stop bc of finger pain. Everything is tender. Taken steroids for feet, the foot doc, givne injectiosn and orally. Feels better when she takes sterods. Took 1 tab a day as needed for steroids, helped while she was taking it. Shooting pain down neck and into arms. Last period 2 years ago- done with menopause. Had to stop taking med for dryness bc it gave her hot flashes while she was in menopause. Sees cardio for heart- every 2 years- for htcz. Takes ibuprofen for pain. Takes tylenol, advil and rotates them- not at the same time. Takes vit d daily. Been taking effector for about a year bc of menopasue. Given prednisone by foot doc. Never smoked. Doesn't drink. \"    Family history significant for mother with Raynaud's disease      Since Last Visit,     Pt is currently taking  Lyrica 50mg in the am [id number] at night and Vitamin D 2000 every day and Effexor 150mg qd. Pt has not had Colonoscopy, isnt scheduled but she still is having issues with belching a lot. Worst joint is neck arms,hands and feet, Rt side worse. Pt also states she is very fatigue. Pt states Lt foot is having sharp pain on top and bottom of foot, started 1 month ago. ROS:     Musculoskeletal ROS:   .    Abnormal:  joint pain, joint swelling, back pain, neck pain  . AM stiffness (hours): 120min  . Pain scale (0-10): 6  . Fatigue scale (0-10): 9  .    Job status: working   . Activities of daily living: some difficulty    positive as above with the addition of   Dry mouth, pedal edema, constipation, GERD symptoms,   Otherwise negative for:  Fevers, chills or sweats. Headaches  Weight  stable  No  scalp tenderness. No jaw claudication. No acute visual changes. No red or dry eyes. No auditory complaints. No nasal discharge or rhinorrhea. No oral lesions or ulcers. No sore throat. No tongue pain. No cough.  No shortness of breath, dyspnea on exertion or wheezing. No hemoptysis. No chest pains or palpitations. No lightheadedness. No abdominal pain,diarrhea . No increased urinary frequency, nocturia, dysuria or changes in urine color. No alopecia, skin rashes/lesions. No changes in skin tightness. No Raynaud's. Photosensitivity. Current Outpatient Medications:     azaTHIOprine (Imuran) 50 mg tablet, Take 1 Tablet by mouth daily. , Disp: 90 Tablet, Rfl: 0    pregabalin (Lyrica) 50 mg capsule, Continue lyrica  50mg in am and 200mg in pm - can skip am dose if too sleepy, Disp: 150 Capsule, Rfl: 2    acetaminophen (Tylenol Extra Strength) 500 mg tablet, Take  by mouth every six (6) hours as needed for Pain., Disp: , Rfl:     hydroCHLOROthiazide (HYDR a worker in her [employer] to see MARA) 25 mg tablet, , Disp: , Rfl:     pantoprazole (PROTONIX) 40 mg tablet, , Disp: , Rfl:     predniSONE (DELTASONE) 5 mg tablet, 5mg daily as needed (Patient not taking: Reported on 4/14/2022), Disp: 90 Tablet, Rfl: 0    venlafaxine-SR (EFFEXOR-XR) 75 mg capsule, , Disp: , Rfl:     Allergies   Allergen Reactions    Daypro [Oxaprozin] Swelling       There is no problem list on file for this patient. Past Medical History:   Diagnosis Date    Anemia     Cervical radiculopathy     Sjogren's disease ([facility] [address]        History reviewed. No pertinent surgical history. History reviewed. No pertinent family history.     Social History     Socioeconomic History    Marital status: SINGLE   Tobacco Use    Smoking status: Never Smoker    Smokeless tobacco: Never Used   Substance and Sexual Activity    Alcohol use: Not Currently            Objective:      Vitals:    12/20/22 1241   BP: (!) 147/91   Pulse: 80   Weight: 131 lb (59.4 kg)           PHYSICAL EXAMINATION:          Physical Exam  General: alert, healthy, well nourished, pleasant, no acute distress  HEENT: Some dry mucous membranes, no oral ulcers  Lungs: clear to auscultation bilaterally without wheezes, rales or rhonchi  Heart: regular rate & rhythm, +S1, +S2, no murmurs  Extremities: no clubbing, cyanosis, or edema  Neurological mood focal deficits conversing fluently. MUSCULOSKELETAL:   -Hands: Increased fullness throughout the MCPs and PIPs  -Wrists: Increased tenderness and fullness throughout bilateral wrists, also distinct CMC tenderness right worse  -Elbows: normal   -Shoulders: Slow but full range of motion due to pain  -Neck: normal   -Back:point tenderness of the low back   -Hips: normal   -Knees: normal   -Ankles: Bilateral tenderness  -Feet: Bilateral tenderness    -Swollen Joint Count:4  -Tender Joint Count: 8    Assessment:       58-year-old very pleasant  lady with past medical history of chronic pain, Sjogren's syndrome, GERD, anemia who presents for f/u of her Sjogren's - high SSA/SSB, mitochondrial antibodies. And REA. Previously stable on gabapentin, when necessary tramadol, and Plaquenil, however off Plaquenil for past few years. Clinical picture for few years fairly noninflammatory, however now looking more like inflammatory polyarthritis, much worse in the past year , dramatic response to steroids, last visit started steroid sparing agent with methotrexate, tolerating unclear significant improvement, called us a couple weeks ago for acute flare and we had called in a prednisone taper which helped. Previously failed methotrexate for hair loss, then started on Imuran and although was tolerating was having significant GI distress on 150 mg, was sent to GI for work-up during which time we had held her meds, has been off for several months now with significant worsening pain stiffness swelling, new neuropathy in hands, likely secondary to swelling. Recommend resuming DMARD as an subcutaneous to avoid GI involvement, patient okay to resume low-dose methotrexate subcu, main concern was hair loss we will give increased folic acid in anticipation of this.       Mild elevated mitochondrial antibodies, and SSA- is followed by GI. Mild anemia - Was seen by hematology, iron deficient given IV iron improved stable now    On replacement vit d               Treatment plan was discussed in detail with patient. Agreement and understanding of the plan was verbalized by the patient. Total visit time 44 minutes, greater than half of the time was spent on counseling. Plan:         1.    effexor  150mg daily for now   2. Continue lyrica  50mg in am and 150mg in pm  3. Start mtx 10mg SQ once weekly with daily folic acid 2mg   4. Tylenol 1000mg every 6 hrs as needed for pain  5. Salagen 5mg upto every 3 times a day as needed   6. RTC in 2 months- call if any issues - with cbc, cmp   7.  Fu with GI

## 2022-12-21 RX ORDER — FOLIC ACID 1 MG/1
1 TABLET ORAL DAILY
Qty: 90 TABLET | Refills: 0 | Status: SHIPPED | OUTPATIENT
Start: 2022-12-21

## 2022-12-21 RX ORDER — PREGABALIN 50 MG/1
CAPSULE ORAL
Qty: 360 CAPSULE | Refills: 0 | Status: SHIPPED | OUTPATIENT
Start: 2022-12-21 | End: 2023-12-20

## 2022-12-21 RX ORDER — VENLAFAXINE HYDROCHLORIDE 150 MG/1
150 CAPSULE, EXTENDED RELEASE ORAL DAILY
Qty: 90 CAPSULE | Refills: 0 | Status: SHIPPED | OUTPATIENT
Start: 2022-12-21

## 2022-12-21 RX ORDER — PILOCARPINE HYDROCHLORIDE 5 MG/1
5 TABLET, FILM COATED ORAL 3 TIMES DAILY PRN
Qty: 270 TABLET | Refills: 0 | Status: SHIPPED | OUTPATIENT
Start: 2022-12-21

## 2023-01-04 ENCOUNTER — TELEPHONE (OUTPATIENT)
Dept: RHEUMATOLOGY | Age: 53
End: 2023-01-04

## 2023-02-22 ENCOUNTER — TELEPHONE (OUTPATIENT)
Dept: RHEUMATOLOGY | Age: 53
End: 2023-02-22

## 2023-02-22 NOTE — TELEPHONE ENCOUNTER
Called pt LVM we dont have any recent labs and we will need them in order to refill her medications.  To please call and have them faxed over to us so that we can get them in her chart to be able to send the refill request to the covering physician with the refill request.

## 2023-02-22 NOTE — TELEPHONE ENCOUNTER
Scheduled patient's new appointment with Bard Baron, and would also like to go over her lab results and also requires a refill for her meds.

## 2023-02-27 DIAGNOSIS — R89.4 SEROLOGIC ABNORMALITY: ICD-10-CM

## 2023-02-27 DIAGNOSIS — R53.83 OTHER FATIGUE: ICD-10-CM

## 2023-02-27 DIAGNOSIS — Z79.899 HIGH RISK MEDICATION USE: ICD-10-CM

## 2023-02-27 DIAGNOSIS — G62.9 NEUROPATHY: ICD-10-CM

## 2023-02-27 DIAGNOSIS — M06.4 INFLAMMATORY POLYARTHRITIS (HCC): ICD-10-CM

## 2023-02-27 DIAGNOSIS — E55.9 HYPOVITAMINOSIS D: ICD-10-CM

## 2023-02-27 DIAGNOSIS — M54.16 RADICULOPATHY, LUMBAR REGION: ICD-10-CM

## 2023-02-27 DIAGNOSIS — Z79.899 LONG TERM CURRENT USE OF IMMUNOSUPPRESSIVE DRUG: ICD-10-CM

## 2023-02-27 DIAGNOSIS — D64.9 ANEMIA, UNSPECIFIED TYPE: ICD-10-CM

## 2023-02-27 DIAGNOSIS — Z79.52 LONG TERM (CURRENT) USE OF SYSTEMIC STEROIDS: ICD-10-CM

## 2023-02-27 DIAGNOSIS — R76.0 ANTINUCLEAR ANTIBODY (ANA) TITER GREATER THAN 1:80: ICD-10-CM

## 2023-02-27 RX ORDER — PILOCARPINE HYDROCHLORIDE 5 MG/1
5 TABLET, FILM COATED ORAL 3 TIMES DAILY PRN
Qty: 270 TABLET | Refills: 0 | Status: SHIPPED | OUTPATIENT
Start: 2023-02-27

## 2023-02-27 RX ORDER — VENLAFAXINE HYDROCHLORIDE 150 MG/1
150 CAPSULE, EXTENDED RELEASE ORAL DAILY
Qty: 90 CAPSULE | Refills: 0 | Status: SHIPPED | OUTPATIENT
Start: 2023-02-27

## 2023-02-27 RX ORDER — FOLIC ACID 1 MG/1
1 TABLET ORAL DAILY
Qty: 90 TABLET | Refills: 0 | Status: SHIPPED | OUTPATIENT
Start: 2023-02-27

## 2023-02-27 RX ORDER — PREGABALIN 50 MG/1
CAPSULE ORAL
Qty: 360 CAPSULE | Refills: 0 | Status: SHIPPED | OUTPATIENT
Start: 2023-02-27 | End: 2024-02-26

## 2023-02-27 NOTE — TELEPHONE ENCOUNTER
Last seen 12/20/22, Labs 2/21/23 attached, refills pended for Lyrica,Effexor,Mtx, FA and Salagen    ALKALINE PHOSPHATASE ISOENZYMES  [facility]  02/21/2023  Component      Alkaline Phosphatase     Component 02/21/2023 05/23/2022 01/24/2022 03/17/2021 10/13/2020 10/04/2020 05/22/2020               Alkaline Phosphatase 111 High     96 121 High     102 113 High     125 High     108 High     Load older lab results         Component 02/21/2023 05/23/2022 01/24/2022 03/17/2021 10/13/2020 10/04/2020 05/22/2020               WBC 5.26 4.52 5.69 5.60 4.37 8.33 6.08 Load older lab results   RBC 3.71 3.79 3.80 3.72 4.17 4.54 3.65 Load older lab results   HGB 11.3 Low     11.7 12.0 10.6 Low     11.3 Low     12.5 10.5 Low     Load older lab results   HCT 35.2 36.4 36.6 34.5 37.3 40.5 33.3 Low     Load older lab results   MCV 94.9 96.0 96.3 92.7 89.4 89.2 91.2 Load older lab results   MCH 30.5 30.9 31.6 28.5 27.1 27.5 28.8 Load older lab results   MCHC 32.1 32.1 32.8 30.7 Low     30.3 Low     30.9 Low     31.5 Low     Load older lab results   RDW 13.6 15.0 14.0 17.6 High     16.2 High     16.3 High     15.3 High     Load older lab results    249 316 289 268 345 290 Load older lab results   MPV 11.8 11.9 11.3 12.8 12.7 12.4 12.6 Load older lab results     Component 02/21/2023 05/23/2022 01/24/2022 03/17/2021 03/17/2021 10/13/2020 10/13/2020 10/04/2020 05/22/2020 05/22/2020 05/22/2020                   Total Protein 6.8 6.8 6.6 6.7 -- 6.8 -- 8.5 High     6.8 6.8 -- Load older lab results   Albumin 4.4 4.6 4.4 4.4 -- 4.6 -- 5.0 High     -- 4.2 -- Load older lab results   Calcium 9.0 9.7 9.5 -- 9.6 -- 9.9 10.2 High     -- -- 9.8 Load older lab results   Glucose 64 Low     75 80 -- 69 Low     -- 90 79 -- -- 79 Load older lab results   BUN 15.0 13.0 15.0 -- 11 -- 12 17 -- -- 15 Load older lab results   Creatinine 0.71 0.58 0.63 -- 0.54 -- 0.65 0.70 -- -- 0.63 Load older lab results   Alkaline Phosphatase 111 High     96 121 High 102 -- 113 High     -- 125 High     -- 108 High     -- Load older lab results   AST (SGOT) -- -- -- 26 -- 23 -- 27 -- 22 -- Load older lab results   Sodium 141 142 140 -- 140 -- 140 144 -- -- 138 Load older lab results   Potassium -- -- -- -- 3.6 -- 3.8 3.2 Low     -- -- 4.2 Load older lab results   Chloride 101 102 100 -- 101 -- 102 100 -- -- 96 Low     Load older lab results   CO2 30 High     29 28 -- 29 -- 32 High     29 -- -- 28 Load older lab results   ALT (SGPT) -- -- -- 19 -- 11 -- 13 -- 10 -- Load older lab results   Globulin 2.4 2.2 2.2 2.3 -- 2.2 -- 3.5 -- 2.6 -- Load older lab results   A/G Ratio -- -- -- 1.9 -- 2.1 -- 1.4 -- 1.6 -- Load older lab results   AnionGap -- -- -- -- 10 -- 7 15 -- -- 14 Load older lab results   eGFR >60 >60 -- -- >60 -- >60 >60 -- -- >60 Load older lab results   eGFR () -- -- -- -- >60 -- >60 >60 -- -- >60 Load older lab results   K (Potassium) 4.1 4.5 3.6 -- -- -- -- -- -- -- --    Anion Gap 10 11 11 -- -- -- -- -- -- -- --    Albumin/Globulin Ratio 1.8 2.1 2.0 -- -- -- -- -- -- -- --    AST 26 23 19 -- -- -- -- -- -- -- --    ALT 13 9 15 -- -- -- -- -- -- -- --    GFR -- -- >60 -- -- -- -- -- -- -- --    GFR () -- -- >60 -- -- -- -- -- -- -- --      Component 02/21/2023 05/23/2022 01/24/2022 03/17/2021 10/13/2020 10/04/2020 05/22/2020 05/22/2020                Total Protein 6.8 6.8 6.6 6.7 6.8 8.5 High     6.8 6.8 Load older lab results   Albumin 4.4 4.6 4.4 4.4 4.6 5.0 High     -- 4.2 Load older lab results   Total Bilirubin -- -- -- 0.2 0.2 0.5 -- 0.2 Load older lab results   Alkaline Phosphatase 111 High     96 121 High     102 113 High     125 High     -- 108 High     Load older lab results   AST (SGOT) -- -- -- 26 23 27 -- 22 Load older lab results   ALT (SGPT) -- -- -- 19 11 13 -- 10 Load older lab results   Globulin 2.4 2.2 2.2 2.3 2.2 3.5 -- 2.6 Load older lab results   A/G Ratio -- -- -- 1.9 2.1 1.4 -- 1.6 Load older lab results Direct Bilirubin -- -- -- 0.2 0.2 -- -- 0.2 Load older lab results   Indirect Bilirubin -- -- -- 0.0 0.0 -- -- 0.0 Load older lab results   Albumin/Globulin Ratio 1.8 2.1 2.0 -- -- -- -- --    Bili T 0.2 0.2 0.3 -- -- -- -- --    AST 26 23 19 -- -- -- -- --    ALT 13 9 15 -- --

## 2023-03-31 DIAGNOSIS — Z79.899 HIGH RISK MEDICATION USE: ICD-10-CM

## 2023-03-31 DIAGNOSIS — D64.9 ANEMIA, UNSPECIFIED TYPE: ICD-10-CM

## 2023-03-31 DIAGNOSIS — Z79.52 LONG TERM (CURRENT) USE OF SYSTEMIC STEROIDS: ICD-10-CM

## 2023-03-31 DIAGNOSIS — G62.9 NEUROPATHY: ICD-10-CM

## 2023-03-31 DIAGNOSIS — R76.0 ANTINUCLEAR ANTIBODY (ANA) TITER GREATER THAN 1:80: ICD-10-CM

## 2023-03-31 DIAGNOSIS — M54.16 RADICULOPATHY, LUMBAR REGION: ICD-10-CM

## 2023-03-31 DIAGNOSIS — R53.83 OTHER FATIGUE: ICD-10-CM

## 2023-03-31 DIAGNOSIS — R89.4 SEROLOGIC ABNORMALITY: ICD-10-CM

## 2023-03-31 DIAGNOSIS — Z79.899 LONG TERM CURRENT USE OF IMMUNOSUPPRESSIVE DRUG: ICD-10-CM

## 2023-03-31 DIAGNOSIS — M06.4 INFLAMMATORY POLYARTHRITIS (HCC): ICD-10-CM

## 2023-03-31 DIAGNOSIS — E55.9 HYPOVITAMINOSIS D: ICD-10-CM

## 2023-03-31 RX ORDER — FOLIC ACID 1 MG/1
2 TABLET ORAL DAILY
Qty: 180 TABLET | Refills: 0 | Status: SHIPPED | OUTPATIENT
Start: 2023-03-31

## 2023-03-31 NOTE — TELEPHONE ENCOUNTER
8.3 g/dL 6.8    Albumin 3.9 - 4.9 g/dL 4.4    Globulin  2.4    Albumin/Globulin Ratio  1.8    Bili T 0.2 - 1.2 mg/dL 0.2    Alkaline Phosphatase 35 - 104 U/L 111 High     AST 5 - 32 U/L 26    ALT 5 - 33 U/L 13    eGFR >=60 mL/min/1.73m*2 >60    Comment: eGFR calculation based on the Chronic Kidney Disease Epidemiology Collaboration (CKD-EPI) 2021 equation without adjustment for race.       Refill pended for FA

## 2023-05-30 ENCOUNTER — OFFICE VISIT (OUTPATIENT)
Dept: RHEUMATOLOGY | Age: 53
End: 2023-05-30
Payer: COMMERCIAL

## 2023-05-30 VITALS
DIASTOLIC BLOOD PRESSURE: 95 MMHG | WEIGHT: 135 LBS | BODY MASS INDEX: 24.84 KG/M2 | SYSTOLIC BLOOD PRESSURE: 173 MMHG | HEIGHT: 62 IN | TEMPERATURE: 83 F

## 2023-05-30 DIAGNOSIS — M06.4 INFLAMMATORY POLYARTHRITIS (HCC): Primary | ICD-10-CM

## 2023-05-30 DIAGNOSIS — Z79.52 LONG TERM (CURRENT) USE OF SYSTEMIC STEROIDS: ICD-10-CM

## 2023-05-30 DIAGNOSIS — R53.83 OTHER FATIGUE: ICD-10-CM

## 2023-05-30 DIAGNOSIS — G62.9 NEUROPATHY: ICD-10-CM

## 2023-05-30 DIAGNOSIS — Z79.899 HIGH RISK MEDICATION USE: ICD-10-CM

## 2023-05-30 DIAGNOSIS — R89.4 SEROLOGIC ABNORMALITY: ICD-10-CM

## 2023-05-30 DIAGNOSIS — Z79.899 OTHER LONG TERM (CURRENT) DRUG THERAPY: ICD-10-CM

## 2023-05-30 DIAGNOSIS — Z79.899 LONG TERM CURRENT USE OF IMMUNOSUPPRESSIVE DRUG: ICD-10-CM

## 2023-05-30 DIAGNOSIS — M54.16 RADICULOPATHY, LUMBAR REGION: ICD-10-CM

## 2023-05-30 DIAGNOSIS — E55.9 VITAMIN D DEFICIENCY, UNSPECIFIED: ICD-10-CM

## 2023-05-30 DIAGNOSIS — R76.0 ANTINUCLEAR ANTIBODY (ANA) TITER GREATER THAN 1:80: ICD-10-CM

## 2023-05-30 DIAGNOSIS — E55.9 HYPOVITAMINOSIS D: ICD-10-CM

## 2023-05-30 DIAGNOSIS — D64.9 ANEMIA, UNSPECIFIED TYPE: ICD-10-CM

## 2023-05-30 PROCEDURE — 99215 OFFICE O/P EST HI 40 MIN: CPT | Performed by: INTERNAL MEDICINE

## 2023-05-30 RX ORDER — PILOCARPINE HYDROCHLORIDE 5 MG/1
5 TABLET, FILM COATED ORAL 3 TIMES DAILY PRN
Qty: 270 TABLET | Refills: 0 | Status: SHIPPED | OUTPATIENT
Start: 2023-05-30

## 2023-05-30 RX ORDER — DEXLANSOPRAZOLE 60 MG/1
60 CAPSULE, DELAYED RELEASE ORAL DAILY
Qty: 30 CAPSULE | Refills: 11 | COMMUNITY
Start: 2023-03-03 | End: 2024-03-02

## 2023-05-30 RX ORDER — FOLIC ACID 1 MG/1
2 TABLET ORAL DAILY
Qty: 180 TABLET | Refills: 0 | Status: SHIPPED | OUTPATIENT
Start: 2023-05-30

## 2023-05-30 RX ORDER — PREGABALIN 50 MG/1
CAPSULE ORAL
Qty: 270 CAPSULE | Refills: 0 | Status: SHIPPED | OUTPATIENT
Start: 2023-05-30 | End: 2024-05-28

## 2023-05-30 RX ORDER — VENLAFAXINE HYDROCHLORIDE 150 MG/1
150 CAPSULE, EXTENDED RELEASE ORAL DAILY
Qty: 90 CAPSULE | Refills: 0 | Status: SHIPPED | OUTPATIENT
Start: 2023-05-30

## 2023-05-30 NOTE — PATIENT INSTRUCTIONS
1.    effexor  150mg daily for now   2. Continue lyrica   150mg in pm  3.  continue mtx but increase to 15mg SQ once weekly with daily folic acid 2mg   4. Tylenol 1000mg every 6 hrs as needed for pain  5. Salagen 5mg upto every 3 times a day as needed   6.    RTC in 3 months- call if any issues - with cbc, cmp, magdalena, lupus panel , c3, c4, UA , vit d and ESR

## 2023-06-28 ENCOUNTER — OFFICE VISIT (OUTPATIENT)
Age: 53
End: 2023-06-28
Payer: COMMERCIAL

## 2023-06-28 VITALS
WEIGHT: 135 LBS | HEIGHT: 62 IN | DIASTOLIC BLOOD PRESSURE: 92 MMHG | SYSTOLIC BLOOD PRESSURE: 150 MMHG | HEART RATE: 61 BPM | BODY MASS INDEX: 24.84 KG/M2

## 2023-06-28 DIAGNOSIS — I10 BENIGN ESSENTIAL HYPERTENSION: ICD-10-CM

## 2023-06-28 DIAGNOSIS — R00.2 PALPITATIONS: ICD-10-CM

## 2023-06-28 DIAGNOSIS — I35.1 NONRHEUMATIC AORTIC VALVE INSUFFICIENCY: Primary | ICD-10-CM

## 2023-06-28 DIAGNOSIS — R06.09 DYSPNEA ON EXERTION: ICD-10-CM

## 2023-06-28 PROBLEM — R76.8 SS-A ANTIBODY POSITIVE: Status: ACTIVE | Noted: 2017-06-28

## 2023-06-28 PROBLEM — D64.9 ANEMIA: Status: ACTIVE | Noted: 2023-06-28

## 2023-06-28 PROBLEM — K21.9 GASTROESOPHAGEAL REFLUX DISEASE: Status: ACTIVE | Noted: 2017-06-28

## 2023-06-28 PROBLEM — M35.01 SJOGREN'S SYNDROME WITH KERATOCONJUNCTIVITIS SICCA (HCC): Status: ACTIVE | Noted: 2022-02-14

## 2023-06-28 PROCEDURE — 99244 OFF/OP CNSLTJ NEW/EST MOD 40: CPT | Performed by: INTERNAL MEDICINE

## 2023-06-28 PROCEDURE — 3080F DIAST BP >= 90 MM HG: CPT | Performed by: INTERNAL MEDICINE

## 2023-06-28 PROCEDURE — 3077F SYST BP >= 140 MM HG: CPT | Performed by: INTERNAL MEDICINE

## 2023-06-28 PROCEDURE — 93000 ELECTROCARDIOGRAM COMPLETE: CPT | Performed by: INTERNAL MEDICINE

## 2023-06-28 RX ORDER — VALSARTAN 80 MG/1
80 TABLET ORAL DAILY
Qty: 30 TABLET | Refills: 11 | Status: SHIPPED | OUTPATIENT
Start: 2023-06-28

## 2023-06-28 RX ORDER — HYDROCHLOROTHIAZIDE 25 MG/1
25 TABLET ORAL DAILY
Qty: 90 TABLET | Refills: 3 | Status: SHIPPED | OUTPATIENT
Start: 2023-06-28

## 2023-06-28 ASSESSMENT — ENCOUNTER SYMPTOMS
HOARSE VOICE: 0
ABDOMINAL PAIN: 0
HEMOPTYSIS: 0
WHEEZING: 0
STRIDOR: 0
EYE REDNESS: 0
HEMATEMESIS: 0
DOUBLE VISION: 0
HEMATOCHEZIA: 0

## 2023-08-18 ENCOUNTER — TELEPHONE (OUTPATIENT)
Age: 53
End: 2023-08-18

## 2023-08-18 NOTE — TELEPHONE ENCOUNTER
----- Message from Fanny Herron MD sent at 8/17/2023  8:03 PM EDT -----  Please let her know that her stress test was normal and we can go over these results with her in detail along with all the images at her follow-up appointment as scheduled on the 21st.  Sincerely,  Carolyn Billingsley MD

## 2023-08-21 ENCOUNTER — OFFICE VISIT (OUTPATIENT)
Age: 53
End: 2023-08-21
Payer: COMMERCIAL

## 2023-08-21 VITALS
DIASTOLIC BLOOD PRESSURE: 80 MMHG | BODY MASS INDEX: 23.81 KG/M2 | HEART RATE: 72 BPM | WEIGHT: 130.2 LBS | RESPIRATION RATE: 12 BRPM | SYSTOLIC BLOOD PRESSURE: 120 MMHG

## 2023-08-21 DIAGNOSIS — R00.2 PALPITATIONS: ICD-10-CM

## 2023-08-21 DIAGNOSIS — R06.09 DYSPNEA ON EXERTION: Primary | ICD-10-CM

## 2023-08-21 DIAGNOSIS — I10 BENIGN ESSENTIAL HYPERTENSION: ICD-10-CM

## 2023-08-21 DIAGNOSIS — I35.1 NONRHEUMATIC AORTIC VALVE INSUFFICIENCY: ICD-10-CM

## 2023-08-21 PROCEDURE — 3079F DIAST BP 80-89 MM HG: CPT | Performed by: INTERNAL MEDICINE

## 2023-08-21 PROCEDURE — 99214 OFFICE O/P EST MOD 30 MIN: CPT | Performed by: INTERNAL MEDICINE

## 2023-08-21 PROCEDURE — 3074F SYST BP LT 130 MM HG: CPT | Performed by: INTERNAL MEDICINE

## 2023-08-21 RX ORDER — PANTOPRAZOLE SODIUM 40 MG/1
40 TABLET, DELAYED RELEASE ORAL DAILY
COMMUNITY

## 2023-08-21 ASSESSMENT — ENCOUNTER SYMPTOMS
STRIDOR: 0
HEMOPTYSIS: 0
HEMATEMESIS: 0
HEMATOCHEZIA: 0
ABDOMINAL PAIN: 0
WHEEZING: 0
DOUBLE VISION: 0
EYE REDNESS: 0
HOARSE VOICE: 0

## 2023-08-21 NOTE — PROGRESS NOTES
told that she has a louder murmur by her GI doctor. Not very impressive today. Past Medical History, Past Surgical History, Family history, Social History, and Medications were all reviewed with the patient today and updated as necessary. Allergies   Allergen Reactions    Oxaprozin Swelling     Patient Active Problem List   Diagnosis    Benign essential hypertension    Gastroesophageal reflux disease    Palpitations    Sjogren's syndrome with keratoconjunctivitis sicca (HCC)    SS-A antibody positive    Anemia     Past Medical History:   Diagnosis Date    Anemia     Cervical radiculopathy     Sjogren's disease ([address])      History reviewed. No pertinent surgical history.   Family History   Problem Relation Age of Onset    Hypertension Mother     Hypertension Father     Heart Attack Maternal Grandfather     Heart Attack Paternal Grandfather      Social History     Tobacco Use    Smoking status: Never    Smokeless tobacco: Never   Substance Use Topics    Alcohol use: Not Currently     Current Outpatient Medications   Medication Sig Dispense Refill    pantoprazole (PROTONIX) 40 MG tablet Take 1 tablet by mouth daily      valsartan (DIOVAN) 80 MG tablet Take 1 tablet by mouth daily 30 tablet 11    hydroCHLOROthiazide (HYDRODIURIL) 25 MG tablet Take 1 tablet by mouth daily 90 tablet 3    venlafaxine (EFFEXOR XR) 150 MG extended release capsule Take 1 capsule by mouth daily 90 capsule 0    pregabalin (LYRICA) 50 MG capsule 150mg in pm daily for now (Patient taking differently: 1 capsule every evening.) 526 capsule 0    folic acid (FOLVITE) 1 MG tablet Take 2 tablets by mouth daily 180 tablet 0    pilocarpine (SALAGEN) 5 MG tablet Take 1 tablet by mouth 3 times daily as needed (as needed) 270 tablet 0    Methotrexate, PF, (RASUVO) 15 MG/0.3ML chemo injection pen Inject 15 mg into the skin once a week 12 Adjustable Dose Pre-filled Pen Syringe 1    sucralfate (CARAFATE) 1 GM tablet in the morning and at bedtime

## 2023-08-30 ENCOUNTER — HOSPITAL ENCOUNTER (OUTPATIENT)
Dept: GENERAL RADIOLOGY | Age: 53
Discharge: HOME | End: 2023-09-02
Payer: COMMERCIAL

## 2023-08-30 ENCOUNTER — OFFICE VISIT (OUTPATIENT)
Dept: RHEUMATOLOGY | Age: 53
End: 2023-08-30
Payer: COMMERCIAL

## 2023-08-30 VITALS
DIASTOLIC BLOOD PRESSURE: 80 MMHG | WEIGHT: 132 LBS | HEART RATE: 81 BPM | BODY MASS INDEX: 24.29 KG/M2 | HEIGHT: 62 IN | SYSTOLIC BLOOD PRESSURE: 122 MMHG

## 2023-08-30 DIAGNOSIS — R53.83 OTHER FATIGUE: ICD-10-CM

## 2023-08-30 DIAGNOSIS — Z79.899 OTHER LONG TERM (CURRENT) DRUG THERAPY: ICD-10-CM

## 2023-08-30 DIAGNOSIS — Z79.899 LONG TERM CURRENT USE OF IMMUNOSUPPRESSIVE DRUG: ICD-10-CM

## 2023-08-30 DIAGNOSIS — R89.4 SEROLOGIC ABNORMALITY: ICD-10-CM
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
## 2023-08-30 DIAGNOSIS — M25.439 PAIN AND SWELLING OF WRIST, UNSPECIFIED LATERALITY: ICD-10-CM

## 2023-08-30 DIAGNOSIS — M54.16 RADICULOPATHY, LUMBAR REGION: ICD-10-CM

## 2023-08-30 DIAGNOSIS — D64.9 ANEMIA, UNSPECIFIED TYPE: ICD-10-CM

## 2023-08-30 DIAGNOSIS — E55.9 VITAMIN D DEFICIENCY, UNSPECIFIED: ICD-10-CM

## 2023-08-30 DIAGNOSIS — M06.4 INFLAMMATORY POLYARTHRITIS (HCC): Primary | ICD-10-CM

## 2023-08-30 DIAGNOSIS — G62.9 NEUROPATHY: ICD-10-CM

## 2023-08-30 DIAGNOSIS — Z79.899 HIGH RISK MEDICATION USE: ICD-10-CM

## 2023-08-30 DIAGNOSIS — R76.0 ANTINUCLEAR ANTIBODY (ANA) TITER GREATER THAN 1:80: ICD-10-CM

## 2023-08-30 DIAGNOSIS — E55.9 HYPOVITAMINOSIS D: ICD-10-CM

## 2023-08-30 DIAGNOSIS — Z79.52 LONG TERM (CURRENT) USE OF SYSTEMIC STEROIDS: ICD-10-CM

## 2023-08-30 DIAGNOSIS — Z71.85 VACCINE COUNSELING: ICD-10-CM

## 2023-08-30 DIAGNOSIS — M06.4 INFLAMMATORY POLYARTHRITIS (HCC): ICD-10-CM

## 2023-08-30 DIAGNOSIS — M25.539 PAIN AND SWELLING OF WRIST, UNSPECIFIED LATERALITY: ICD-10-CM

## 2023-08-30 PROCEDURE — 99215 OFFICE O/P EST HI 40 MIN: CPT | Performed by: INTERNAL MEDICINE

## 2023-08-30 PROCEDURE — 73130 X-RAY EXAM OF HAND: CPT

## 2023-08-30 PROCEDURE — 3078F DIAST BP <80 MM HG: CPT | Performed by: INTERNAL MEDICINE

## 2023-08-30 PROCEDURE — 3074F SYST BP LT 130 MM HG: CPT | Performed by: INTERNAL MEDICINE

## 2023-08-30 RX ORDER — PILOCARPINE HYDROCHLORIDE 5 MG/1
5 TABLET, FILM COATED ORAL 3 TIMES DAILY PRN
Qty: 270 TABLET | Refills: 0 | Status: SHIPPED | OUTPATIENT
Start: 2023-08-30

## 2023-08-30 RX ORDER — PREGABALIN 50 MG/1
CAPSULE ORAL
Qty: 270 CAPSULE | Refills: 1 | Status: SHIPPED | OUTPATIENT
Start: 2023-08-30 | End: 2024-08-28

## 2023-08-30 RX ORDER — PREDNISONE 5 MG/1
TABLET ORAL
Qty: 18 TABLET | Refills: 0 | Status: SHIPPED | OUTPATIENT
Start: 2023-08-30

## 2023-08-30 RX ORDER — VENLAFAXINE HYDROCHLORIDE 150 MG/1
150 CAPSULE, EXTENDED RELEASE ORAL DAILY
Qty: 90 CAPSULE | Refills: 1 | Status: SHIPPED | OUTPATIENT
Start: 2023-08-30

## 2023-08-30 RX ORDER — FOLIC ACID 1 MG/1
2 TABLET ORAL DAILY
Qty: 180 TABLET | Refills: 1 | Status: SHIPPED | OUTPATIENT
Start: 2023-08-30

## 2023-08-30 NOTE — PROGRESS NOTES
Subjective:           History of Present Illness  Permanent History: Mrs dixon is a very pleasant 46  lady-year-old lady with past medical history of chronic pain, Sjogren's syndrome, GERD, anemia who presents for evaluation of her Sjogren's. Patient previously seeing dr Manju Bernardo, for longstanding has several pain complaints, from bilateral shoulder and knee pain in addition to hands, no swelling or redness -was treated with tramadol - and takes it approximately once or twice daily. She was first diagnosed with Sjogren's many years ago when she had elevated serologies in addition to multiple pain complaints. Her anemia has been pretty much constant for the past few years at one time dropping so low she required iron infusions. She has been seen by Dr. Loree Burger in the past with a colonoscopy that was negative., Patient was seen by me for a few years until my move has been followed locally since then. \"Update since then has been fairly quiescent symptoms for a couple of years but more recently increasing pain, stiffness, especially in the mornings with tingling numbness in the hands and feet. Also significant increasing pain in neck, was referred to surgeon underwent shots in the neck and lower back. Spinal surgeon referred to pain management. Neck and back pain and hand pain. Hands hurt all the time. Will wake up in the middle of the night with hand drawing. Seeing foot doctor, getting shots in feet cortisone, which does help for a few weeks. Feet hurt when she walks, has a sharp shooting pain that goes up the legs and into back. Was thinking about going to chiropactor but scared. Has really bad stiffness in the morning and anytime she stands up when she is sittingl having heel soreness. For the past year, this pain is getting gradually worse. the owrst pain at ngiht and first thing in the morning. Not taking plaquneil.  Just taking lyrica - takes 100mg at night- cant take during the day bc ot makes her

## 2023-08-30 NOTE — PATIENT INSTRUCTIONS
1.    effexor  150mg daily for now   2. Continue lyrica   150mg in pm  3.  continue mtx  15mg SQ once weekly with daily folic acid 2mg   4. Tylenol 1000mg every 6 hrs as needed for pain  5. Salagen 5mg upto every 3 times a day as needed   6. X-rays of hands bilaterally follow-up inflammatory polyarthritis -patient is on methotrexate, increased stiffness and swelling in the right hand recently-- BS RAD  7. Flu shot when available next month  8. Start prednisone 15 mg daily for the next 3 days then 10 mg for 3 days then 5 mg for 3 days and stop  9.  RTC in 3 months- call if any issues - with cbc, cmp, magdalena, lupus panel , c3, c4, UA , vit d and ESR  SHAW WU PT

## 2023-12-12 ENCOUNTER — OFFICE VISIT (OUTPATIENT)
Dept: RHEUMATOLOGY | Age: 53
End: 2023-12-12
Payer: COMMERCIAL

## 2023-12-12 VITALS
SYSTOLIC BLOOD PRESSURE: 145 MMHG | WEIGHT: 136.2 LBS | OXYGEN SATURATION: 98 % | BODY MASS INDEX: 25.06 KG/M2 | HEIGHT: 62 IN | HEART RATE: 89 BPM | DIASTOLIC BLOOD PRESSURE: 99 MMHG

## 2023-12-12 DIAGNOSIS — M54.16 RADICULOPATHY, LUMBAR REGION: ICD-10-CM

## 2023-12-12 DIAGNOSIS — R76.0 ANTINUCLEAR ANTIBODY (ANA) TITER GREATER THAN 1:80: ICD-10-CM

## 2023-12-12 DIAGNOSIS — Z71.85 VACCINE COUNSELING: ICD-10-CM

## 2023-12-12 DIAGNOSIS — E55.9 HYPOVITAMINOSIS D: ICD-10-CM

## 2023-12-12 DIAGNOSIS — R89.4 SEROLOGIC ABNORMALITY: ICD-10-CM

## 2023-12-12 DIAGNOSIS — Z79.899 HIGH RISK MEDICATION USE: ICD-10-CM

## 2023-12-12 DIAGNOSIS — M06.4 INFLAMMATORY POLYARTHRITIS (HCC): Primary | ICD-10-CM

## 2023-12-12 DIAGNOSIS — Z79.52 LONG TERM (CURRENT) USE OF SYSTEMIC STEROIDS: ICD-10-CM

## 2023-12-12 DIAGNOSIS — Z79.899 LONG TERM CURRENT USE OF IMMUNOSUPPRESSIVE DRUG: ICD-10-CM

## 2023-12-12 DIAGNOSIS — M15.9 GENERALIZED OSTEOARTHRITIS: ICD-10-CM

## 2023-12-12 DIAGNOSIS — R53.83 OTHER FATIGUE: ICD-10-CM

## 2023-12-12 PROCEDURE — 99215 OFFICE O/P EST HI 40 MIN: CPT | Performed by: INTERNAL MEDICINE

## 2023-12-12 PROCEDURE — 90471 IMMUNIZATION ADMIN: CPT | Performed by: INTERNAL MEDICINE

## 2023-12-12 PROCEDURE — 90694 VACC AIIV4 NO PRSRV 0.5ML IM: CPT | Performed by: INTERNAL MEDICINE

## 2023-12-12 PROCEDURE — 3080F DIAST BP >= 90 MM HG: CPT | Performed by: INTERNAL MEDICINE

## 2023-12-12 PROCEDURE — 3077F SYST BP >= 140 MM HG: CPT | Performed by: INTERNAL MEDICINE

## 2023-12-12 ASSESSMENT — PATIENT HEALTH QUESTIONNAIRE - PHQ9
SUM OF ALL RESPONSES TO PHQ QUESTIONS 1-9: 0
2. FEELING DOWN, DEPRESSED OR HOPELESS: 0
SUM OF ALL RESPONSES TO PHQ QUESTIONS 1-9: 0

## 2023-12-12 NOTE — PATIENT INSTRUCTIONS
1.    effexor  150mg daily for now   2. Continue lyrica   150mg in pm  3.  continue mtx  15mg SQ once weekly with daily folic acid 2mg   4. Tylenol 1000mg every 6 hrs as needed for pain  5. Salagen 5mg upto every 3 times a day as needed   6. Prednisone 5 mg once daily as needed please take for the next week or so and then as needed  7. Continue vitamin D 2000 units daily  8.

## 2023-12-12 NOTE — PROGRESS NOTES
up-to-date          Treatment plan was discussed in detail with patient. Agreement and understanding of the plan was verbalized by the patient. Total visit time 40 minutes, greater than half of the time was spent on counseling. Plan:         1.    effexor  150mg daily for now   2. Continue lyrica   150mg in pm  3.  continue mtx  15mg SQ once weekly with daily folic acid 2mg   4. Tylenol 1000mg every 6 hrs as needed for pain  5. Salagen 5mg upto every 3 times a day as needed   6. Prednisone 5 mg once daily as needed please take for the next week or so and then as needed  7. Continue vitamin D 2000 units daily  8.   RTC in 4 months- call if any issues - with cbc, cmp, magdalena, lupus panel , c3, c4, UA , vit d and ESR

## 2024-02-29 DIAGNOSIS — Z79.899 OTHER LONG TERM (CURRENT) DRUG THERAPY: ICD-10-CM

## 2024-02-29 DIAGNOSIS — M06.4 INFLAMMATORY POLYARTHRITIS (HCC): ICD-10-CM

## 2024-02-29 DIAGNOSIS — M54.16 RADICULOPATHY, LUMBAR REGION: ICD-10-CM

## 2024-02-29 DIAGNOSIS — E55.9 HYPOVITAMINOSIS D: ICD-10-CM

## 2024-02-29 DIAGNOSIS — R89.4 SEROLOGIC ABNORMALITY: ICD-10-CM

## 2024-02-29 DIAGNOSIS — E55.9 VITAMIN D DEFICIENCY, UNSPECIFIED: ICD-10-CM

## 2024-02-29 DIAGNOSIS — Z79.899 LONG TERM CURRENT USE OF IMMUNOSUPPRESSIVE DRUG: ICD-10-CM

## 2024-02-29 DIAGNOSIS — R53.83 OTHER FATIGUE: ICD-10-CM

## 2024-02-29 DIAGNOSIS — Z79.52 LONG TERM (CURRENT) USE OF SYSTEMIC STEROIDS: ICD-10-CM

## 2024-02-29 DIAGNOSIS — G62.9 NEUROPATHY: ICD-10-CM

## 2024-02-29 DIAGNOSIS — R76.0 ANTINUCLEAR ANTIBODY (ANA) TITER GREATER THAN 1:80: ICD-10-CM

## 2024-02-29 DIAGNOSIS — Z79.899 HIGH RISK MEDICATION USE: ICD-10-CM

## 2024-02-29 DIAGNOSIS — D64.9 ANEMIA, UNSPECIFIED TYPE: ICD-10-CM

## 2024-03-04 RX ORDER — FOLIC ACID 1 MG/1
2 TABLET ORAL DAILY
Qty: 180 TABLET | Refills: 0 | Status: SHIPPED | OUTPATIENT
Start: 2024-03-04

## 2024-03-19 DIAGNOSIS — I10 BENIGN ESSENTIAL HYPERTENSION: ICD-10-CM

## 2024-03-19 RX ORDER — HYDROCHLOROTHIAZIDE 25 MG/1
25 TABLET ORAL DAILY
Qty: 90 TABLET | Refills: 3 | Status: SHIPPED | OUTPATIENT
Start: 2024-03-19

## 2024-03-19 NOTE — TELEPHONE ENCOUNTER
Requested Prescriptions     Pending Prescriptions Disp Refills    hydroCHLOROthiazide (HYDRODIURIL) 25 MG tablet [Pharmacy Med Name: HYDROCHLOROTHIAZIDE 25MG TABLETS] 90 tablet 3     Sig: TAKE 1 TABLET BY MOUTH DAILY

## 2024-04-17 ENCOUNTER — OFFICE VISIT (OUTPATIENT)
Dept: RHEUMATOLOGY | Age: 54
End: 2024-04-17
Payer: COMMERCIAL

## 2024-04-17 VITALS
DIASTOLIC BLOOD PRESSURE: 78 MMHG | BODY MASS INDEX: 23.74 KG/M2 | WEIGHT: 129 LBS | HEART RATE: 79 BPM | HEIGHT: 62 IN | SYSTOLIC BLOOD PRESSURE: 131 MMHG

## 2024-04-17 DIAGNOSIS — K21.9 CHRONIC GASTROESOPHAGEAL REFLUX DISEASE: ICD-10-CM

## 2024-04-17 DIAGNOSIS — Z79.899 LONG TERM CURRENT USE OF IMMUNOSUPPRESSIVE DRUG: ICD-10-CM

## 2024-04-17 DIAGNOSIS — Z79.899 OTHER LONG TERM (CURRENT) DRUG THERAPY: ICD-10-CM

## 2024-04-17 DIAGNOSIS — M06.4 INFLAMMATORY POLYARTHRITIS (HCC): Primary | ICD-10-CM

## 2024-04-17 DIAGNOSIS — R76.0 ANTINUCLEAR ANTIBODY (ANA) TITER GREATER THAN 1:80: ICD-10-CM

## 2024-04-17 DIAGNOSIS — R53.83 OTHER FATIGUE: ICD-10-CM

## 2024-04-17 DIAGNOSIS — K92.1 BLACK STOOLS: ICD-10-CM

## 2024-04-17 DIAGNOSIS — E55.9 HYPOVITAMINOSIS D: ICD-10-CM

## 2024-04-17 DIAGNOSIS — E55.9 VITAMIN D DEFICIENCY, UNSPECIFIED: ICD-10-CM

## 2024-04-17 DIAGNOSIS — G62.9 NEUROPATHY: ICD-10-CM

## 2024-04-17 DIAGNOSIS — D64.9 ANEMIA, UNSPECIFIED TYPE: ICD-10-CM

## 2024-04-17 DIAGNOSIS — Z79.899 HIGH RISK MEDICATION USE: ICD-10-CM

## 2024-04-17 DIAGNOSIS — Z79.52 LONG TERM (CURRENT) USE OF SYSTEMIC STEROIDS: ICD-10-CM

## 2024-04-17 DIAGNOSIS — R89.4 SEROLOGIC ABNORMALITY: ICD-10-CM

## 2024-04-17 DIAGNOSIS — M54.16 RADICULOPATHY, LUMBAR REGION: ICD-10-CM

## 2024-04-17 PROCEDURE — 3078F DIAST BP <80 MM HG: CPT | Performed by: INTERNAL MEDICINE

## 2024-04-17 PROCEDURE — 3075F SYST BP GE 130 - 139MM HG: CPT | Performed by: INTERNAL MEDICINE

## 2024-04-17 PROCEDURE — 99215 OFFICE O/P EST HI 40 MIN: CPT | Performed by: INTERNAL MEDICINE

## 2024-04-17 RX ORDER — PANTOPRAZOLE SODIUM 40 MG/1
40 TABLET, DELAYED RELEASE ORAL 2 TIMES DAILY
Qty: 180 TABLET | Refills: 1 | Status: SHIPPED | OUTPATIENT
Start: 2024-04-17

## 2024-04-17 RX ORDER — VENLAFAXINE HYDROCHLORIDE 150 MG/1
150 CAPSULE, EXTENDED RELEASE ORAL DAILY
Qty: 90 CAPSULE | Refills: 1 | Status: SHIPPED | OUTPATIENT
Start: 2024-04-17

## 2024-04-17 RX ORDER — FOLIC ACID 1 MG/1
2 TABLET ORAL DAILY
Qty: 180 TABLET | Refills: 0 | Status: SHIPPED | OUTPATIENT
Start: 2024-04-17

## 2024-04-17 RX ORDER — OMEPRAZOLE 20 MG/1
20 CAPSULE, DELAYED RELEASE ORAL DAILY
COMMUNITY

## 2024-04-17 RX ORDER — PREDNISONE 5 MG/1
TABLET ORAL
Qty: 100 TABLET | Refills: 1 | Status: SHIPPED | OUTPATIENT
Start: 2024-04-17

## 2024-04-17 RX ORDER — FAMOTIDINE 10 MG
10 TABLET ORAL 2 TIMES DAILY
Qty: 180 TABLET | Refills: 1 | Status: SHIPPED | OUTPATIENT
Start: 2024-04-17

## 2024-04-17 RX ORDER — PILOCARPINE HYDROCHLORIDE 5 MG/1
5 TABLET, FILM COATED ORAL 3 TIMES DAILY PRN
Qty: 270 TABLET | Refills: 1 | Status: SHIPPED | OUTPATIENT
Start: 2024-04-17

## 2024-04-17 RX ORDER — PREGABALIN 50 MG/1
CAPSULE ORAL
Qty: 270 CAPSULE | Refills: 1 | Status: SHIPPED | OUTPATIENT
Start: 2024-04-17 | End: 2025-04-16

## 2024-04-17 NOTE — PROGRESS NOTES
pain.  No cough. No shortness of breath, dyspnea on exertion or wheezing.  No hemoptysis.  No chest pains or palpitations. No lightheadedness.  No abdominal pain,diarrhea .    No increased urinary frequency, nocturia, dysuria or changes in urine color.    No alopecia, skin rashes/lesions. No changes in skin tightness. No Raynaud's. Photosensitivity.        Current Outpatient Medications:     azaTHIOprine (Imuran) 50 mg tablet, Take 1 Tablet by mouth daily., Disp: 90 Tablet, Rfl: 0    pregabalin (Lyrica) 50 mg capsule, Continue lyrica  50mg in am and 200mg in pm - can skip am dose if too sleepy, Disp: 150 Capsule, Rfl: 2    acetaminophen (Tylenol Extra Strength) 500 mg tablet, Take  by mouth every six (6) hours as needed for Pain., Disp: , Rfl:     hydroCHLOROthiazide (HYDR a worker in her [employer] to see ODIURIL) 25 mg tablet, , Disp: , Rfl:     pantoprazole (PROTONIX) 40 mg tablet, , Disp: , Rfl:     predniSONE (DELTASONE) 5 mg tablet, 5mg daily as needed (Patient not taking: Reported on 4/14/2022), Disp: 90 Tablet, Rfl: 0    venlafaxine-SR (EFFEXOR-XR) 75 mg capsule, , Disp: , Rfl:     Allergies   Allergen Reactions    Daypro [Oxaprozin] Swelling       There is no problem list on file for this patient.      Past Medical History:   Diagnosis Date    Anemia     Cervical radiculopathy     Sjogren's disease (HCC)        History reviewed. No pertinent surgical history.    History reviewed. No pertinent family history.    Social History     Socioeconomic History    Marital status: SINGLE   Tobacco Use    Smoking status: Never Smoker    Smokeless tobacco: Never Used   Substance and Sexual Activity    Alcohol use: Not Currently            Objective:      Vitals:    04/17/24 1237   BP: 131/78   Pulse: 79   Weight: 58.5 kg (129 lb)   Height: 1.575 m (5' 2\")            PHYSICAL EXAMINATION:        General: alert, healthy, well nourished, pleasant, no acute distress  Lungs: clear to auscultation bilaterally without wheezes, rales

## 2024-04-17 NOTE — PATIENT INSTRUCTIONS
1.    effexor  150mg daily for now   2.  Continue lyrica   150mg in pm  3.  continue mtx  15mg SQ once weekly with daily folic acid 2mg   4.  Tylenol 1000mg every 6 hrs as needed for pain  5.  Salagen 5mg upto every 3 times a day as needed   6.   Prednisone 5 mg once daily as needed please take for the next week or so and then as needed  7.  Okay to hold vitamin D for now and restart in a month just 2000 units daily-year-old level is currently very good  8.  Increase Protonix to 40 mg twice daily  9.  Start famotidine 10 mg twice daily  10.  Refer to Dr. Shaikh DERIAN Diego, 54-year-old patient with positive MAGDALENA, elevated mitochondrial antibodies, inflammatory polyarthritis on methotrexate, OA and radiculopathy, now with significantly worsening GERD and intermittent black stools.  11. RTC in 4 months- call if any issues - with cbc, cmp, magdalena, lupus panel , c3, c4, UA , vit d and ESR       4month,L[facility] pt,GI

## 2024-05-29 ENCOUNTER — TELEPHONE (OUTPATIENT)
Dept: RHEUMATOLOGY | Age: 54
End: 2024-05-29

## 2024-05-29 NOTE — TELEPHONE ENCOUNTER
PA for Rasu[employer] completed online on [employer], sent to JULIA with clinicals.   Approved today  CaseId:[id number];Status:Approved;Review Type:Prior Auth;Coverage Start Date:04/29/2024;Coverage End Date:05/29/2025;  Authorization Expiration Date: 5/28/2025

## 2024-07-01 NOTE — TELEPHONE ENCOUNTER
OV 4/17-included wrap up, Appt 8/28,  Dx Inflamm Arth,Neuropathy,Radiculopathy    Pt called today stating she is having increased rt arm and Rt leg pain, its intermittent, she doesn't know what to do for the pain        Plan:           1.    effexor  150mg daily for now   2.  Continue lyrica   150mg in pm  3.  continue mtx  15mg SQ once weekly with daily folic acid 2mg   4.  Tylenol 1000mg every 6 hrs as needed for pain  5.  Salagen 5mg upto every 3 times a day as needed   6.   Prednisone 5 mg once daily as needed please take for the next week or so and then as needed  7.  Okay to hold vitamin D for now and restart in a month just 2000 units daily-year-old level is currently very good  8.  Increase Protonix to 40 mg twice daily  9.  Start famotidine 10 mg twice daily  10.  Refer to Dr. Shaikh DERIAN Diego, 54-year-old patient with positive MAGDALENA, elevated mitochondrial antibodies, inflammatory polyarthritis on methotrexate, OA and radiculopathy, now with significantly worsening GERD and intermittent black stools.  11. RTC in 4 months- call if any issues - with cbc, cmp, magdalena, lupus panel , c3, c4, UA , vit d and ESR

## 2024-07-03 DIAGNOSIS — I10 BENIGN ESSENTIAL HYPERTENSION: ICD-10-CM

## 2024-07-03 RX ORDER — HYDROCHLOROTHIAZIDE 25 MG/1
25 TABLET ORAL DAILY
Qty: 90 TABLET | Refills: 3 | Status: SHIPPED | OUTPATIENT
Start: 2024-07-03

## 2024-07-03 NOTE — TELEPHONE ENCOUNTER
Requested Prescriptions     Pending Prescriptions Disp Refills    hydroCHLOROthiazide (HYDRODIURIL) 25 MG tablet 90 tablet 3     Sig: Take 1 tablet by mouth daily

## 2024-07-03 NOTE — TELEPHONE ENCOUNTER
MEDICATION REFILL REQUEST      Name of Medication:  Hydrochlorothiazide  Dose:  25 mg  Frequency:  QD  Quantity:  90  Days' supply:  90 with refills      Pharmacy Name/Location:  [id number]

## 2024-07-03 NOTE — TELEPHONE ENCOUNTER
Pred Rx pended per your instruction-phone in, Pred 40mg x 3 days, 20mg x 3 days, 10mg x 3days ten 5mg x 3 days then stop.  the patient voiced understanding

## 2024-07-08 RX ORDER — PREDNISONE 10 MG/1
TABLET ORAL
Qty: 23 TABLET | Refills: 0 | OUTPATIENT
Start: 2024-07-08

## 2024-08-13 ENCOUNTER — OFFICE VISIT (OUTPATIENT)
Age: 54
End: 2024-08-13
Payer: COMMERCIAL

## 2024-08-13 VITALS
HEART RATE: 74 BPM | WEIGHT: 127 LBS | BODY MASS INDEX: 23.37 KG/M2 | HEIGHT: 62 IN | SYSTOLIC BLOOD PRESSURE: 138 MMHG | DIASTOLIC BLOOD PRESSURE: 88 MMHG

## 2024-08-13 DIAGNOSIS — R00.2 PALPITATIONS: ICD-10-CM

## 2024-08-13 DIAGNOSIS — I10 BENIGN ESSENTIAL HYPERTENSION: Primary | ICD-10-CM

## 2024-08-13 DIAGNOSIS — I35.1 NONRHEUMATIC AORTIC VALVE INSUFFICIENCY: ICD-10-CM

## 2024-08-13 DIAGNOSIS — R06.09 DYSPNEA ON EXERTION: ICD-10-CM

## 2024-08-13 PROCEDURE — 3075F SYST BP GE 130 - 139MM HG: CPT | Performed by: INTERNAL MEDICINE

## 2024-08-13 PROCEDURE — 99214 OFFICE O/P EST MOD 30 MIN: CPT | Performed by: INTERNAL MEDICINE

## 2024-08-13 PROCEDURE — 3079F DIAST BP 80-89 MM HG: CPT | Performed by: INTERNAL MEDICINE

## 2024-08-13 PROCEDURE — 93000 ELECTROCARDIOGRAM COMPLETE: CPT | Performed by: INTERNAL MEDICINE

## 2024-08-13 ASSESSMENT — ENCOUNTER SYMPTOMS
WHEEZING: 0
ABDOMINAL PAIN: 0
HOARSE VOICE: 0
EYE REDNESS: 0
HEMATOCHEZIA: 0
STRIDOR: 0
DOUBLE VISION: 0
HEMOPTYSIS: 0
HEMATEMESIS: 0

## 2024-08-13 NOTE — PROGRESS NOTES
[facility] CARDIOLOGY  [address] [place] [address], [address]  [place] [address]  PHONE: [phone]          24    NAME:  Anna Ross  : 1970  MRN: [id number]         SUBJECTIVE:   Anna Ross is a 54 y.o. female seen for a visit regarding the following:     Chief Complaint   Patient presents with    1 Year Follow Up    nonrheumatic aortic valve insufficiency    dyspnea on exertion           HPI:    Cardio problem list:  1.  Aortic regurgitation  Echo from 2023 showed an EF at 55 to 60% with normal reported diastolic function, mild to moderate aortic regurgitation  Echo from 2021 with an EF at 55 to 60%, mild AI  2.  Hypertension  3.  Palpitations  4.  Anxiety  5. RA- Dr Castorena  6. Dyspnea on exertion  Negative stress echo-2023, EF 55 to 60%-11 METS  -Previous cardiologist at [facility]- Dr Lee    Dear Annabelle Caballero,    I saw Ms. Ross who is a pleasant 54-year-old woman in cardiovascular follow-up for dyspnea on exertion, aortic regurgitation with known underlying hypertension and a family history of coronary artery disease.  She has rheumatoid arthritis.    We last met with her a year ago at which time we made no significant changes with her medical therapy-we reassured with the findings on her echocardiogram which showed preserved LV function and mild to moderate AR.    Dyspnea on exertion-overall substantially improved compared to when we last met with her.  She managed to achieve 11 metabolic equivalents.  No complaints of any significant chest pain suggestive of angina.  Has some symptoms consistent with acid reflux.  No significant chest pain in any way-remains on omeprazole at this point.    Hypertension: Has been on hydrochlorothiazide and now also doing well with low-dose valsartan 80 mg daily-had some transient lower extremity edema previously-now resolved no headaches or blurry vision.      No TIAs or strokelike symptoms., has had some trouble with taste and

## 2024-08-19 DIAGNOSIS — Z79.899 OTHER LONG TERM (CURRENT) DRUG THERAPY: ICD-10-CM

## 2024-08-19 DIAGNOSIS — Z79.52 LONG TERM (CURRENT) USE OF SYSTEMIC STEROIDS: ICD-10-CM

## 2024-08-19 DIAGNOSIS — R76.0 ANTINUCLEAR ANTIBODY (ANA) TITER GREATER THAN 1:80: ICD-10-CM

## 2024-08-19 DIAGNOSIS — R89.4 SEROLOGIC ABNORMALITY: ICD-10-CM

## 2024-08-19 DIAGNOSIS — Z79.899 LONG TERM CURRENT USE OF IMMUNOSUPPRESSIVE DRUG: ICD-10-CM

## 2024-08-19 DIAGNOSIS — Z79.899 HIGH RISK MEDICATION USE: ICD-10-CM

## 2024-08-19 DIAGNOSIS — M06.4 INFLAMMATORY POLYARTHRITIS (HCC): Primary | ICD-10-CM

## 2024-08-20 DIAGNOSIS — Z79.899 HIGH RISK MEDICATION USE: ICD-10-CM

## 2024-08-20 DIAGNOSIS — R53.83 OTHER FATIGUE: ICD-10-CM

## 2024-08-20 DIAGNOSIS — Z79.52 LONG TERM (CURRENT) USE OF SYSTEMIC STEROIDS: ICD-10-CM

## 2024-08-20 DIAGNOSIS — R89.4 SEROLOGIC ABNORMALITY: ICD-10-CM

## 2024-08-20 DIAGNOSIS — M06.4 INFLAMMATORY POLYARTHRITIS (HCC): Primary | ICD-10-CM

## 2024-08-20 DIAGNOSIS — M15.9 GENERALIZED OSTEOARTHRITIS: ICD-10-CM

## 2024-08-20 DIAGNOSIS — R76.0 ANTINUCLEAR ANTIBODY (ANA) TITER GREATER THAN 1:80: ICD-10-CM

## 2024-08-20 DIAGNOSIS — Z79.899 LONG TERM CURRENT USE OF IMMUNOSUPPRESSIVE DRUG: ICD-10-CM

## 2024-08-28 ENCOUNTER — OFFICE VISIT (OUTPATIENT)
Dept: RHEUMATOLOGY | Age: 54
End: 2024-08-28

## 2024-08-28 VITALS
DIASTOLIC BLOOD PRESSURE: 98 MMHG | SYSTOLIC BLOOD PRESSURE: 151 MMHG | HEIGHT: 62 IN | BODY MASS INDEX: 22.08 KG/M2 | HEART RATE: 76 BPM | WEIGHT: 120 LBS

## 2024-08-28 DIAGNOSIS — R76.0 ANTINUCLEAR ANTIBODY (ANA) TITER GREATER THAN 1:80: ICD-10-CM

## 2024-08-28 DIAGNOSIS — D64.9 ANEMIA, UNSPECIFIED TYPE: ICD-10-CM

## 2024-08-28 DIAGNOSIS — K92.1 BLACK STOOLS: ICD-10-CM

## 2024-08-28 DIAGNOSIS — K21.9 CHRONIC GASTROESOPHAGEAL REFLUX DISEASE: ICD-10-CM

## 2024-08-28 DIAGNOSIS — M25.561 RIGHT KNEE PAIN, UNSPECIFIED CHRONICITY: ICD-10-CM

## 2024-08-28 DIAGNOSIS — G62.9 NEUROPATHY: ICD-10-CM

## 2024-08-28 DIAGNOSIS — R53.83 OTHER FATIGUE: ICD-10-CM

## 2024-08-28 DIAGNOSIS — M06.4 INFLAMMATORY POLYARTHRITIS (HCC): ICD-10-CM

## 2024-08-28 DIAGNOSIS — Z79.52 LONG TERM (CURRENT) USE OF SYSTEMIC STEROIDS: ICD-10-CM

## 2024-08-28 DIAGNOSIS — Z79.899 LONG TERM CURRENT USE OF IMMUNOSUPPRESSIVE DRUG: ICD-10-CM

## 2024-08-28 DIAGNOSIS — E55.9 HYPOVITAMINOSIS D: ICD-10-CM

## 2024-08-28 DIAGNOSIS — M35.05 SJOGREN'S SYNDROME WITH INFLAMMATORY ARTHRITIS (HCC): Primary | ICD-10-CM

## 2024-08-28 DIAGNOSIS — M54.16 RADICULOPATHY, LUMBAR REGION: ICD-10-CM

## 2024-08-28 DIAGNOSIS — Z79.899 HIGH RISK MEDICATION USE: ICD-10-CM

## 2024-08-28 DIAGNOSIS — R89.4 SEROLOGIC ABNORMALITY: ICD-10-CM

## 2024-08-28 DIAGNOSIS — Z71.85 VACCINE COUNSELING: ICD-10-CM

## 2024-08-28 RX ORDER — METHYLPREDNISOLONE ACETATE 80 MG/ML
80 INJECTION, SUSPENSION INTRA-ARTICULAR; INTRALESIONAL; INTRAMUSCULAR; SOFT TISSUE ONCE
Status: COMPLETED | OUTPATIENT
Start: 2024-08-28 | End: 2024-08-28

## 2024-08-28 RX ADMIN — METHYLPREDNISOLONE ACETATE 80 MG: 80 INJECTION, SUSPENSION INTRA-ARTICULAR; INTRALESIONAL; INTRAMUSCULAR; SOFT TISSUE at 15:00

## 2024-08-29 DIAGNOSIS — G62.9 NEUROPATHY: ICD-10-CM

## 2024-08-29 DIAGNOSIS — E55.9 HYPOVITAMINOSIS D: ICD-10-CM

## 2024-08-29 DIAGNOSIS — Z79.899 OTHER LONG TERM (CURRENT) DRUG THERAPY: ICD-10-CM

## 2024-08-29 DIAGNOSIS — M06.4 INFLAMMATORY POLYARTHRITIS (HCC): ICD-10-CM

## 2024-08-29 DIAGNOSIS — E55.9 VITAMIN D DEFICIENCY, UNSPECIFIED: ICD-10-CM

## 2024-08-29 DIAGNOSIS — K21.9 CHRONIC GASTROESOPHAGEAL REFLUX DISEASE: ICD-10-CM

## 2024-08-29 DIAGNOSIS — Z79.52 LONG TERM (CURRENT) USE OF SYSTEMIC STEROIDS: ICD-10-CM

## 2024-08-29 DIAGNOSIS — D64.9 ANEMIA, UNSPECIFIED TYPE: ICD-10-CM

## 2024-08-29 DIAGNOSIS — R53.83 OTHER FATIGUE: ICD-10-CM

## 2024-08-29 DIAGNOSIS — M54.16 RADICULOPATHY, LUMBAR REGION: ICD-10-CM

## 2024-08-29 DIAGNOSIS — R76.0 ANTINUCLEAR ANTIBODY (ANA) TITER GREATER THAN 1:80: ICD-10-CM

## 2024-08-29 DIAGNOSIS — Z79.899 LONG TERM CURRENT USE OF IMMUNOSUPPRESSIVE DRUG: ICD-10-CM

## 2024-08-29 DIAGNOSIS — Z79.899 HIGH RISK MEDICATION USE: ICD-10-CM

## 2024-08-29 DIAGNOSIS — K92.1 BLACK STOOLS: ICD-10-CM

## 2024-08-29 DIAGNOSIS — R89.4 SEROLOGIC ABNORMALITY: ICD-10-CM

## 2024-08-29 RX ORDER — FOLIC ACID 1 MG/1
2 TABLET ORAL DAILY
Qty: 180 TABLET | Refills: 0 | Status: SHIPPED | OUTPATIENT
Start: 2024-08-29

## 2024-08-29 RX ORDER — PANTOPRAZOLE SODIUM 40 MG/1
40 TABLET, DELAYED RELEASE ORAL 2 TIMES DAILY
Qty: 180 TABLET | Refills: 1 | Status: SHIPPED | OUTPATIENT
Start: 2024-08-29

## 2024-08-29 RX ORDER — PILOCARPINE HYDROCHLORIDE 5 MG/1
TABLET, FILM COATED ORAL
Qty: 270 TABLET | Refills: 1 | Status: SHIPPED | OUTPATIENT
Start: 2024-08-29

## 2024-08-29 RX ORDER — PREGABALIN 50 MG/1
CAPSULE ORAL
Qty: 270 CAPSULE | Refills: 1 | Status: SHIPPED | OUTPATIENT
Start: 2024-08-29 | End: 2024-08-29 | Stop reason: SDUPTHER

## 2024-08-29 RX ORDER — PREGABALIN 50 MG/1
CAPSULE ORAL
Qty: 270 CAPSULE | Refills: 1 | Status: SHIPPED | OUTPATIENT
Start: 2024-08-29 | End: 2025-08-27

## 2024-08-29 RX ORDER — PREDNISONE 5 MG/1
TABLET ORAL
Qty: 100 TABLET | Refills: 0 | Status: SHIPPED | OUTPATIENT
Start: 2024-08-29

## 2024-08-29 RX ORDER — FAMOTIDINE 10 MG
10 TABLET ORAL 2 TIMES DAILY
Qty: 180 TABLET | Refills: 1 | Status: SHIPPED | OUTPATIENT
Start: 2024-08-29

## 2024-08-29 RX ORDER — VENLAFAXINE HYDROCHLORIDE 150 MG/1
150 CAPSULE, EXTENDED RELEASE ORAL DAILY
Qty: 90 CAPSULE | Refills: 1 | Status: SHIPPED | OUTPATIENT
Start: 2024-08-29

## 2024-08-29 NOTE — TELEPHONE ENCOUNTER
Pt seen yesterday,Lyrica Rx printed?- must be error escribing over, didn't escribe, Pended rx to escribe to [facility]

## 2024-09-26 ENCOUNTER — OFFICE VISIT (OUTPATIENT)
Dept: ONCOLOGY | Age: 54
End: 2024-09-26
Payer: COMMERCIAL

## 2024-09-26 ENCOUNTER — HOSPITAL ENCOUNTER (OUTPATIENT)
Dept: LAB | Age: 54
Discharge: HOME | End: 2024-09-29
Payer: COMMERCIAL

## 2024-09-26 VITALS
BODY MASS INDEX: 23.63 KG/M2 | RESPIRATION RATE: 16 BRPM | TEMPERATURE: 98 F | WEIGHT: 128.4 LBS | HEIGHT: 62 IN | DIASTOLIC BLOOD PRESSURE: 76 MMHG | SYSTOLIC BLOOD PRESSURE: 144 MMHG | OXYGEN SATURATION: 98 % | HEART RATE: 76 BPM

## 2024-09-26 DIAGNOSIS — D64.9 ANEMIA, UNSPECIFIED TYPE: Primary | ICD-10-CM

## 2024-09-26 DIAGNOSIS — D64.9 ANEMIA, UNSPECIFIED TYPE: ICD-10-CM

## 2024-09-26 LAB
ALBUMIN SERPL-MCNC: 4 G/DL (ref 3.5–5)
ALBUMIN/GLOB SERPL: 1.3 (ref 1–1.9)
ALP SERPL-CCNC: 90 U/L (ref 35–104)
ALT SERPL-CCNC: 11 U/L (ref 8–45)
ANION GAP SERPL CALC-SCNC: 10 MMOL/L (ref 9–18)
AST SERPL-CCNC: 25 U/L (ref 15–37)
BASOPHILS # BLD: 0 K/UL (ref 0–0.2)
BASOPHILS NFR BLD: 0 % (ref 0–2)
BILIRUB SERPL-MCNC: <0.2 MG/DL (ref 0–1.2)
BUN SERPL-MCNC: 12 MG/DL (ref 6–23)
CALCIUM SERPL-MCNC: 9.9 MG/DL (ref 8.8–10.2)
CHLORIDE SERPL-SCNC: 101 MMOL/L (ref 98–107)
CO2 SERPL-SCNC: 30 MMOL/L (ref 20–28)
CREAT SERPL-MCNC: 0.67 MG/DL (ref 0.6–1.1)
DIFFERENTIAL METHOD BLD: ABNORMAL
EOSINOPHIL # BLD: 0.1 K/UL (ref 0–0.8)
EOSINOPHIL NFR BLD: 2 % (ref 0.5–7.8)
ERYTHROCYTE [DISTWIDTH] IN BLOOD BY AUTOMATED COUNT: 18.8 % (ref 11.9–14.6)
FERRITIN SERPL-MCNC: 9 NG/ML (ref 8–388)
FOLATE SERPL-MCNC: >40 NG/ML (ref 3.1–17.5)
GLOBULIN SER CALC-MCNC: 3.1 G/DL (ref 2.3–3.5)
GLUCOSE SERPL-MCNC: 86 MG/DL (ref 70–99)
HAPTOGLOB SERPL-MCNC: 151 MG/DL (ref 30–200)
HCT VFR BLD AUTO: 33.4 % (ref 35.8–46.3)
HGB BLD-MCNC: 10.5 G/DL (ref 11.7–15.4)
HGB RETIC QN AUTO: 31 PG (ref 29–35)
IMM GRANULOCYTES # BLD AUTO: 0 K/UL (ref 0–0.5)
IMM GRANULOCYTES NFR BLD AUTO: 0 % (ref 0–5)
IMM RETICS NFR: 6.3 % (ref 3–15.9)
IRON SATN MFR SERPL: 26 % (ref 20–50)
IRON SERPL-MCNC: 100 UG/DL (ref 35–100)
LYMPHOCYTES # BLD: 1.7 K/UL (ref 0.5–4.6)
LYMPHOCYTES NFR BLD: 37 % (ref 13–44)
MCH RBC QN AUTO: 30.3 PG (ref 26.1–32.9)
MCHC RBC AUTO-ENTMCNC: 31.4 G/DL (ref 31.4–35)
MCV RBC AUTO: 96.5 FL (ref 82–102)
MONOCYTES # BLD: 0.2 K/UL (ref 0.1–1.3)
MONOCYTES NFR BLD: 5 % (ref 4–12)
NEUTS SEG # BLD: 2.6 K/UL (ref 1.7–8.2)
NEUTS SEG NFR BLD: 56 % (ref 43–78)
NRBC # BLD: 0 K/UL (ref 0–0.2)
PLATELET # BLD AUTO: 299 K/UL (ref 150–450)
PMV BLD AUTO: 11.4 FL (ref 9.4–12.3)
POTASSIUM SERPL-SCNC: 3.3 MMOL/L (ref 3.5–5.1)
PROT SERPL-MCNC: 7.1 G/DL (ref 6.3–8.2)
RBC # BLD AUTO: 3.46 M/UL (ref 4.05–5.2)
RETICS # AUTO: 0.04 M/UL (ref 0.03–0.1)
RETICS/RBC NFR AUTO: 1 % (ref 0.3–2)
SODIUM SERPL-SCNC: 141 MMOL/L (ref 136–145)
TIBC SERPL-MCNC: 382 UG/DL (ref 240–450)
TSH W FREE THYROID IF ABNORMAL: 2.16 UIU/ML (ref 0.27–4.2)
UIBC SERPL-MCNC: 282 UG/DL (ref 112–347)
VIT B12 SERPL-MCNC: 549 PG/ML (ref 193–986)
WBC # BLD AUTO: 4.6 K/UL (ref 4.3–11.1)

## 2024-09-26 PROCEDURE — 83090 ASSAY OF HOMOCYSTEINE: CPT

## 2024-09-26 PROCEDURE — 82607 VITAMIN B-12: CPT

## 2024-09-26 PROCEDURE — 3077F SYST BP >= 140 MM HG: CPT | Performed by: INTERNAL MEDICINE

## 2024-09-26 PROCEDURE — 83010 ASSAY OF HAPTOGLOBIN QUANT: CPT

## 2024-09-26 PROCEDURE — 85025 COMPLETE CBC W/AUTO DIFF WBC: CPT

## 2024-09-26 PROCEDURE — 99205 OFFICE O/P NEW HI 60 MIN: CPT | Performed by: INTERNAL MEDICINE

## 2024-09-26 PROCEDURE — 85046 RETICYTE/HGB CONCENTRATE: CPT

## 2024-09-26 PROCEDURE — 82784 ASSAY IGA/IGD/IGG/IGM EACH: CPT

## 2024-09-26 PROCEDURE — 3078F DIAST BP <80 MM HG: CPT | Performed by: INTERNAL MEDICINE

## 2024-09-26 PROCEDURE — 86335 IMMUNFIX E-PHORSIS/URINE/CSF: CPT

## 2024-09-26 PROCEDURE — 83921 ORGANIC ACID SINGLE QUANT: CPT

## 2024-09-26 PROCEDURE — 80053 COMPREHEN METABOLIC PANEL: CPT

## 2024-09-26 PROCEDURE — 82746 ASSAY OF FOLIC ACID SERUM: CPT

## 2024-09-26 PROCEDURE — 84238 ASSAY NONENDOCRINE RECEPTOR: CPT

## 2024-09-26 PROCEDURE — 86334 IMMUNOFIX E-PHORESIS SERUM: CPT

## 2024-09-26 PROCEDURE — 83540 ASSAY OF IRON: CPT

## 2024-09-26 PROCEDURE — 36415 COLL VENOUS BLD VENIPUNCTURE: CPT

## 2024-09-26 PROCEDURE — 84156 ASSAY OF PROTEIN URINE: CPT

## 2024-09-26 PROCEDURE — 84166 PROTEIN E-PHORESIS/URINE/CSF: CPT

## 2024-09-26 PROCEDURE — 82728 ASSAY OF FERRITIN: CPT

## 2024-09-26 PROCEDURE — 83550 IRON BINDING TEST: CPT

## 2024-09-26 PROCEDURE — 84443 ASSAY THYROID STIM HORMONE: CPT

## 2024-09-26 ASSESSMENT — PATIENT HEALTH QUESTIONNAIRE - PHQ9
2. FEELING DOWN, DEPRESSED OR HOPELESS: NOT AT ALL
SUM OF ALL RESPONSES TO PHQ9 QUESTIONS 1 & 2: 0
SUM OF ALL RESPONSES TO PHQ QUESTIONS 1-9: 0
1. LITTLE INTEREST OR PLEASURE IN DOING THINGS: NOT AT ALL
SUM OF ALL RESPONSES TO PHQ QUESTIONS 1-9: 0

## 2024-09-27 LAB
HCYS SERPL-SCNC: 12.3 UMOL/L (ref 0–14.5)
IGA SERPL-MCNC: 58 MG/DL (ref 87–352)
IGG SERPL-MCNC: 794 MG/DL (ref 586–1602)
IGM SERPL-MCNC: 44 MG/DL (ref 26–217)
PROT PATTERN SERPL IFE-IMP: ABNORMAL
TRANSFERRIN SERPL-SCNC: 31.3 NMOL/L (ref 12.2–27.3)

## 2024-09-30 ENCOUNTER — TELEPHONE (OUTPATIENT)
Dept: ONCOLOGY | Age: 54
End: 2024-09-30

## 2024-09-30 LAB
ALBUMIN 24H MFR UR ELPH: 19.6 %
ALPHA1 GLOB 24H MFR UR ELPH: 5.2 %
ALPHA2 GLOB 24H MFR UR ELPH: 13 %
B-GLOBULIN MFR UR ELPH: 30.5 %
GAMMA GLOB 24H MFR UR ELPH: 31.6 %
IGA SERPL-MCNC: 58 MG/DL (ref 87–352)
IGG SERPL-MCNC: 794 MG/DL (ref 586–1602)
IGM SERPL-MCNC: 44 MG/DL (ref 26–217)
INTERPRETATION UR IFE-IMP: NORMAL
Lab: NORMAL
M PROTEIN 24H MFR UR ELPH: NORMAL %
METHYLMALONATE SERPL-SCNC: 279 NMOL/L (ref 0–378)
PATH REV BLD -IMP: NORMAL
PROT PATTERN SERPL IFE-IMP: ABNORMAL
PROT UR-MCNC: 6.7 MG/DL

## 2024-09-30 NOTE — TELEPHONE ENCOUNTER
Ashlyn msg routed to pt.  Msg sent to infusion schedulers to arrange Feraheme x2 and notify pt of apt details    ----- Message from Dr. Carlos Kasper MD sent at 9/28/2024  6:33 PM EDT -----  FERRITIN 9. PLEASE ARRANGE IV IRON

## 2024-10-06 RX ORDER — FOLIC ACID 1 MG/1
TABLET ORAL
Qty: 90 TABLET | OUTPATIENT
Start: 2024-10-06

## 2024-10-07 DIAGNOSIS — D64.9 ANEMIA, UNSPECIFIED TYPE: Primary | ICD-10-CM

## 2024-10-14 ENCOUNTER — TELEPHONE (OUTPATIENT)
Dept: RADIATION ONCOLOGY | Age: 54
End: 2024-10-14

## 2024-10-14 DIAGNOSIS — D64.9 ANEMIA, UNSPECIFIED TYPE: Primary | ICD-10-CM

## 2024-10-14 NOTE — TELEPHONE ENCOUNTER
Physician provider: Dr. Kasper  Reason for today's call (Please detail here patients chief complaint): Pt states she did some labs at home and need to know can she mail it. Per pt she live in [place]. Request a call back.     Last office visit:09/26/24  Patient Callback Number: [phone]  Was callback number verified?: No  Preferred pharmacy (If refill request): na  Calls to office within the last 48 hours?:No    Patient notified that their information will be routed to the [facility] clinical triage team for review. Patient is advised that they will receive a phone call from the triage department. If symptom related and symptoms worsen before receiving a call back, the patient has been advised to proceed to the nearest ED.

## 2024-10-14 NOTE — TELEPHONE ENCOUNTER
Patient completed Hemoccult cards and wants to know if she can mail them to us. Per lab, she can mail them. Confirmed she has the correct address.

## 2024-10-21 ENCOUNTER — HOSPITAL ENCOUNTER (OUTPATIENT)
Dept: LAB | Age: 54
Discharge: HOME | End: 2024-10-24
Payer: COMMERCIAL

## 2024-10-21 LAB
HEMOCCULT STL QL: NEGATIVE

## 2024-10-21 PROCEDURE — 82272 OCCULT BLD FECES 1-3 TESTS: CPT

## 2024-11-08 ENCOUNTER — APPOINTMENT (OUTPATIENT)
Dept: INFUSION THERAPY | Age: 54
End: 2024-11-08
Payer: COMMERCIAL

## 2024-11-13 RX ORDER — HEPARIN SODIUM (PORCINE) LOCK FLUSH IV SOLN 100 UNIT/ML 100 UNIT/ML
500 SOLUTION INTRAVENOUS PRN
Status: CANCELLED | OUTPATIENT
Start: 2024-11-13

## 2024-11-13 RX ORDER — SODIUM CHLORIDE 0.9 % (FLUSH) 0.9 %
5-40 SYRINGE (ML) INJECTION PRN
Status: CANCELLED | OUTPATIENT
Start: 2024-11-13

## 2024-11-13 RX ORDER — EPINEPHRINE 1 MG/ML
0.3 INJECTION, SOLUTION, CONCENTRATE INTRAVENOUS PRN
Status: CANCELLED | OUTPATIENT
Start: 2024-11-13

## 2024-11-13 RX ORDER — FAMOTIDINE 10 MG/ML
20 INJECTION, SOLUTION INTRAVENOUS
Status: CANCELLED | OUTPATIENT
Start: 2024-11-13

## 2024-11-13 RX ORDER — SODIUM CHLORIDE 9 MG/ML
5-250 INJECTION, SOLUTION INTRAVENOUS PRN
Status: CANCELLED | OUTPATIENT
Start: 2024-11-13

## 2024-11-13 RX ORDER — DIPHENHYDRAMINE HYDROCHLORIDE 50 MG/ML
50 INJECTION INTRAMUSCULAR; INTRAVENOUS
Status: CANCELLED | OUTPATIENT
Start: 2024-11-13

## 2024-11-13 RX ORDER — ONDANSETRON 2 MG/ML
8 INJECTION INTRAMUSCULAR; INTRAVENOUS
Status: CANCELLED | OUTPATIENT
Start: 2024-11-13

## 2024-11-13 RX ORDER — SODIUM CHLORIDE 9 MG/ML
INJECTION, SOLUTION INTRAVENOUS CONTINUOUS
Status: CANCELLED | OUTPATIENT
Start: 2024-11-13

## 2024-11-13 RX ORDER — ACETAMINOPHEN 325 MG/1
650 TABLET ORAL
Status: CANCELLED | OUTPATIENT
Start: 2024-11-13

## 2024-11-13 RX ORDER — ALBUTEROL SULFATE 90 UG/1
4 INHALANT RESPIRATORY (INHALATION) PRN
Status: CANCELLED | OUTPATIENT
Start: 2024-11-13

## 2024-11-15 ENCOUNTER — HOSPITAL ENCOUNTER (OUTPATIENT)
Dept: INFUSION THERAPY | Age: 54
Setting detail: INFUSION SERIES
Discharge: HOME | End: 2024-11-15
Payer: COMMERCIAL

## 2024-11-15 VITALS
SYSTOLIC BLOOD PRESSURE: 135 MMHG | OXYGEN SATURATION: 100 % | RESPIRATION RATE: 17 BRPM | HEART RATE: 79 BPM | TEMPERATURE: 98 F | DIASTOLIC BLOOD PRESSURE: 70 MMHG

## 2024-11-15 DIAGNOSIS — D50.9 IRON DEFICIENCY ANEMIA, UNSPECIFIED IRON DEFICIENCY ANEMIA TYPE: Primary | ICD-10-CM

## 2024-11-15 PROCEDURE — 96365 THER/PROPH/DIAG IV INF INIT: CPT

## 2024-11-15 PROCEDURE — 2580000003 HC RX 258: Performed by: NURSE PRACTITIONER

## 2024-11-15 PROCEDURE — 6360000002 HC RX W HCPCS: Performed by: NURSE PRACTITIONER

## 2024-11-15 RX ORDER — SODIUM CHLORIDE 9 MG/ML
5-250 INJECTION, SOLUTION INTRAVENOUS PRN
OUTPATIENT
Start: 2024-11-22

## 2024-11-15 RX ORDER — ONDANSETRON 2 MG/ML
8 INJECTION INTRAMUSCULAR; INTRAVENOUS
OUTPATIENT
Start: 2024-11-22

## 2024-11-15 RX ORDER — HYDROCORTISONE SODIUM SUCCINATE 100 MG/2ML
100 INJECTION INTRAMUSCULAR; INTRAVENOUS
Status: DISCONTINUED | OUTPATIENT
Start: 2024-11-15 | End: 2024-11-16 | Stop reason: HOSPADM

## 2024-11-15 RX ORDER — EPINEPHRINE 1 MG/ML
0.3 INJECTION, SOLUTION, CONCENTRATE INTRAVENOUS PRN
Status: DISCONTINUED | OUTPATIENT
Start: 2024-11-15 | End: 2024-11-16 | Stop reason: HOSPADM

## 2024-11-15 RX ORDER — HEPARIN 100 UNIT/ML
500 SYRINGE INTRAVENOUS PRN
OUTPATIENT
Start: 2024-11-22

## 2024-11-15 RX ORDER — ALBUTEROL SULFATE 90 UG/1
4 INHALANT RESPIRATORY (INHALATION) PRN
OUTPATIENT
Start: 2024-11-22

## 2024-11-15 RX ORDER — DIPHENHYDRAMINE HYDROCHLORIDE 50 MG/ML
50 INJECTION INTRAMUSCULAR; INTRAVENOUS
Status: DISCONTINUED | OUTPATIENT
Start: 2024-11-15 | End: 2024-11-16 | Stop reason: HOSPADM

## 2024-11-15 RX ORDER — SODIUM CHLORIDE 9 MG/ML
5-250 INJECTION, SOLUTION INTRAVENOUS PRN
Status: DISCONTINUED | OUTPATIENT
Start: 2024-11-15 | End: 2024-11-16 | Stop reason: HOSPADM

## 2024-11-15 RX ORDER — SODIUM CHLORIDE 0.9 % (FLUSH) 0.9 %
5-40 SYRINGE (ML) INJECTION PRN
Status: DISCONTINUED | OUTPATIENT
Start: 2024-11-15 | End: 2024-11-16 | Stop reason: HOSPADM

## 2024-11-15 RX ORDER — DIPHENHYDRAMINE HYDROCHLORIDE 50 MG/ML
50 INJECTION INTRAMUSCULAR; INTRAVENOUS
OUTPATIENT
Start: 2024-11-22

## 2024-11-15 RX ORDER — ALBUTEROL SULFATE 90 UG/1
4 INHALANT RESPIRATORY (INHALATION) PRN
Status: DISCONTINUED | OUTPATIENT
Start: 2024-11-15 | End: 2024-11-16 | Stop reason: HOSPADM

## 2024-11-15 RX ORDER — ONDANSETRON 2 MG/ML
8 INJECTION INTRAMUSCULAR; INTRAVENOUS
Status: DISCONTINUED | OUTPATIENT
Start: 2024-11-15 | End: 2024-11-16 | Stop reason: HOSPADM

## 2024-11-15 RX ORDER — ACETAMINOPHEN 325 MG/1
650 TABLET ORAL
OUTPATIENT
Start: 2024-11-22

## 2024-11-15 RX ORDER — HYDROCORTISONE SODIUM SUCCINATE 100 MG/2ML
100 INJECTION INTRAMUSCULAR; INTRAVENOUS
OUTPATIENT
Start: 2024-11-22

## 2024-11-15 RX ORDER — EPINEPHRINE 1 MG/ML
0.3 INJECTION, SOLUTION, CONCENTRATE INTRAVENOUS PRN
OUTPATIENT
Start: 2024-11-22

## 2024-11-15 RX ORDER — SODIUM CHLORIDE 9 MG/ML
INJECTION, SOLUTION INTRAVENOUS CONTINUOUS
OUTPATIENT
Start: 2024-11-22

## 2024-11-15 RX ORDER — SODIUM CHLORIDE 0.9 % (FLUSH) 0.9 %
5-40 SYRINGE (ML) INJECTION PRN
Status: CANCELLED | OUTPATIENT
Start: 2024-11-22

## 2024-11-15 RX ORDER — ACETAMINOPHEN 325 MG/1
650 TABLET ORAL
Status: DISCONTINUED | OUTPATIENT
Start: 2024-11-15 | End: 2024-11-16 | Stop reason: HOSPADM

## 2024-11-15 RX ADMIN — FERUMOXYTOL 510 MG: 510 INJECTION INTRAVENOUS at 14:09

## 2024-11-15 RX ADMIN — SODIUM CHLORIDE, PRESERVATIVE FREE 10 ML: 5 INJECTION INTRAVENOUS at 14:00

## 2024-11-15 NOTE — PROGRESS NOTES
Arrived to the Infusion Center.  Feraheme infusion completed. Patient tolerated well.   Any issues or concerns during appointment: none.  Patient aware of next infusion appointment on 11/22/24 (date) at 1:15 (time)..  Patient instructed to call provider with temperature of 100.4 or greater or nausea/vomiting/ diarrhea or pain not controlled by medications  Discharged ambulatory.

## 2024-11-20 DIAGNOSIS — Z79.899 HIGH RISK MEDICATION USE: ICD-10-CM

## 2024-11-20 DIAGNOSIS — E55.9 HYPOVITAMINOSIS D: ICD-10-CM

## 2024-11-20 DIAGNOSIS — R53.83 OTHER FATIGUE: ICD-10-CM

## 2024-11-20 DIAGNOSIS — Z79.52 LONG TERM (CURRENT) USE OF SYSTEMIC STEROIDS: ICD-10-CM

## 2024-11-20 DIAGNOSIS — E55.9 VITAMIN D DEFICIENCY, UNSPECIFIED: ICD-10-CM

## 2024-11-20 DIAGNOSIS — R89.4 SEROLOGIC ABNORMALITY: ICD-10-CM

## 2024-11-20 DIAGNOSIS — R76.0 ANTINUCLEAR ANTIBODY (ANA) TITER GREATER THAN 1:80: ICD-10-CM

## 2024-11-20 DIAGNOSIS — Z79.899 LONG TERM CURRENT USE OF IMMUNOSUPPRESSIVE DRUG: ICD-10-CM

## 2024-11-20 DIAGNOSIS — M06.4 INFLAMMATORY POLYARTHRITIS (HCC): Primary | ICD-10-CM

## 2024-11-20 DIAGNOSIS — Z79.899 OTHER LONG TERM (CURRENT) DRUG THERAPY: ICD-10-CM

## 2024-11-20 DIAGNOSIS — D64.9 ANEMIA, UNSPECIFIED TYPE: ICD-10-CM

## 2024-11-22 ENCOUNTER — HOSPITAL ENCOUNTER (OUTPATIENT)
Dept: INFUSION THERAPY | Age: 54
Setting detail: INFUSION SERIES
Discharge: HOME | End: 2024-11-22
Payer: COMMERCIAL

## 2024-11-22 VITALS
TEMPERATURE: 97.6 F | SYSTOLIC BLOOD PRESSURE: 123 MMHG | RESPIRATION RATE: 16 BRPM | DIASTOLIC BLOOD PRESSURE: 68 MMHG | HEART RATE: 74 BPM | OXYGEN SATURATION: 100 %

## 2024-11-22 DIAGNOSIS — D50.9 IRON DEFICIENCY ANEMIA, UNSPECIFIED IRON DEFICIENCY ANEMIA TYPE: Primary | ICD-10-CM

## 2024-11-22 PROCEDURE — 2580000003 HC RX 258: Performed by: NURSE PRACTITIONER

## 2024-11-22 PROCEDURE — 6360000002 HC RX W HCPCS: Performed by: NURSE PRACTITIONER

## 2024-11-22 PROCEDURE — 96365 THER/PROPH/DIAG IV INF INIT: CPT

## 2024-11-22 RX ORDER — HYDROCORTISONE SODIUM SUCCINATE 100 MG/2ML
100 INJECTION INTRAMUSCULAR; INTRAVENOUS
OUTPATIENT
Start: 2024-11-22

## 2024-11-22 RX ORDER — SODIUM CHLORIDE 0.9 % (FLUSH) 0.9 %
5-40 SYRINGE (ML) INJECTION PRN
OUTPATIENT
Start: 2024-11-22

## 2024-11-22 RX ORDER — ONDANSETRON 2 MG/ML
8 INJECTION INTRAMUSCULAR; INTRAVENOUS
OUTPATIENT
Start: 2024-11-22

## 2024-11-22 RX ORDER — SODIUM CHLORIDE 9 MG/ML
INJECTION, SOLUTION INTRAVENOUS CONTINUOUS
OUTPATIENT
Start: 2024-11-22

## 2024-11-22 RX ORDER — SODIUM CHLORIDE 9 MG/ML
5-250 INJECTION, SOLUTION INTRAVENOUS PRN
OUTPATIENT
Start: 2024-11-22

## 2024-11-22 RX ORDER — DIPHENHYDRAMINE HYDROCHLORIDE 50 MG/ML
50 INJECTION INTRAMUSCULAR; INTRAVENOUS
OUTPATIENT
Start: 2024-11-22

## 2024-11-22 RX ORDER — ALBUTEROL SULFATE 90 UG/1
4 INHALANT RESPIRATORY (INHALATION) PRN
OUTPATIENT
Start: 2024-11-22

## 2024-11-22 RX ORDER — SODIUM CHLORIDE 0.9 % (FLUSH) 0.9 %
5-40 SYRINGE (ML) INJECTION PRN
Status: DISCONTINUED | OUTPATIENT
Start: 2024-11-22 | End: 2024-11-23 | Stop reason: HOSPADM

## 2024-11-22 RX ORDER — EPINEPHRINE 1 MG/ML
0.3 INJECTION, SOLUTION, CONCENTRATE INTRAVENOUS PRN
OUTPATIENT
Start: 2024-11-22

## 2024-11-22 RX ORDER — ACETAMINOPHEN 325 MG/1
650 TABLET ORAL
OUTPATIENT
Start: 2024-11-22

## 2024-11-22 RX ORDER — HEPARIN 100 UNIT/ML
500 SYRINGE INTRAVENOUS PRN
OUTPATIENT
Start: 2024-11-22

## 2024-11-22 RX ADMIN — FERUMOXYTOL 510 MG: 510 INJECTION INTRAVENOUS at 13:37

## 2024-11-22 RX ADMIN — SODIUM CHLORIDE, PRESERVATIVE FREE 10 ML: 5 INJECTION INTRAVENOUS at 13:35

## 2024-12-09 ENCOUNTER — HOSPITAL ENCOUNTER (OUTPATIENT)
Dept: LAB | Age: 54
Discharge: HOME | End: 2024-12-09
Payer: COMMERCIAL

## 2024-12-09 DIAGNOSIS — M06.4 INFLAMMATORY POLYARTHRITIS (HCC): ICD-10-CM

## 2024-12-09 DIAGNOSIS — Z79.899 HIGH RISK MEDICATION USE: ICD-10-CM

## 2024-12-09 DIAGNOSIS — Z79.899 OTHER LONG TERM (CURRENT) DRUG THERAPY: ICD-10-CM

## 2024-12-09 DIAGNOSIS — D64.9 ANEMIA, UNSPECIFIED TYPE: ICD-10-CM

## 2024-12-09 DIAGNOSIS — Z79.52 LONG TERM (CURRENT) USE OF SYSTEMIC STEROIDS: ICD-10-CM

## 2024-12-09 DIAGNOSIS — R53.83 OTHER FATIGUE: ICD-10-CM

## 2024-12-09 DIAGNOSIS — E55.9 HYPOVITAMINOSIS D: ICD-10-CM

## 2024-12-09 DIAGNOSIS — E55.9 VITAMIN D DEFICIENCY, UNSPECIFIED: ICD-10-CM

## 2024-12-09 DIAGNOSIS — R89.4 SEROLOGIC ABNORMALITY: ICD-10-CM

## 2024-12-09 DIAGNOSIS — Z79.899 LONG TERM CURRENT USE OF IMMUNOSUPPRESSIVE DRUG: ICD-10-CM

## 2024-12-09 DIAGNOSIS — R76.0 ANTINUCLEAR ANTIBODY (ANA) TITER GREATER THAN 1:80: ICD-10-CM

## 2024-12-09 LAB
ALBUMIN SERPL-MCNC: 3.7 G/DL (ref 3.5–5)
ALBUMIN/GLOB SERPL: 1.2 (ref 1–1.9)
ALP SERPL-CCNC: 84 U/L (ref 35–104)
ALT SERPL-CCNC: 14 U/L (ref 8–45)
ANION GAP SERPL CALC-SCNC: 9 MMOL/L (ref 7–16)
APPEARANCE UR: CLEAR
AST SERPL-CCNC: 26 U/L (ref 15–37)
BACTERIA URNS QL MICRO: ABNORMAL /HPF
BASOPHILS # BLD: 0 K/UL (ref 0–0.2)
BASOPHILS NFR BLD: 0 % (ref 0–2)
BILIRUB SERPL-MCNC: <0.2 MG/DL (ref 0–1.2)
BILIRUB UR QL: NEGATIVE
BUN SERPL-MCNC: 13 MG/DL (ref 6–23)
CALCIUM SERPL-MCNC: 9.7 MG/DL (ref 8.8–10.2)
CASTS URNS QL MICRO: 0 /LPF
CHLORIDE SERPL-SCNC: 103 MMOL/L (ref 98–107)
CO2 SERPL-SCNC: 31 MMOL/L (ref 20–29)
COLOR UR: ABNORMAL
CREAT SERPL-MCNC: 0.76 MG/DL (ref 0.6–1.1)
CRYSTALS URNS QL MICRO: ABNORMAL /LPF
DIFFERENTIAL METHOD BLD: ABNORMAL
EOSINOPHIL # BLD: 0.1 K/UL (ref 0–0.8)
EOSINOPHIL NFR BLD: 1 % (ref 0.5–7.8)
EPI CELLS #/AREA URNS HPF: ABNORMAL /HPF
ERYTHROCYTE [DISTWIDTH] IN BLOOD BY AUTOMATED COUNT: 19.4 % (ref 11.9–14.6)
ERYTHROCYTE [DISTWIDTH] IN BLOOD BY AUTOMATED COUNT: 19.9 % (ref 11.9–14.6)
ERYTHROCYTE [SEDIMENTATION RATE] IN BLOOD: 6 MM/HR (ref 0–30)
FERRITIN SERPL-MCNC: 448 NG/ML (ref 8–388)
GLOBULIN SER CALC-MCNC: 3 G/DL (ref 2.3–3.5)
GLUCOSE SERPL-MCNC: 95 MG/DL (ref 70–99)
GLUCOSE UR STRIP.AUTO-MCNC: NEGATIVE MG/DL
HCT VFR BLD AUTO: 34.7 % (ref 35.8–46.3)
HCT VFR BLD AUTO: 35 % (ref 35.8–46.3)
HGB BLD-MCNC: 10.9 G/DL (ref 11.7–15.4)
HGB BLD-MCNC: 11 G/DL (ref 11.7–15.4)
HGB UR QL STRIP: NEGATIVE
IMM GRANULOCYTES # BLD AUTO: 0 K/UL (ref 0–0.5)
IMM GRANULOCYTES NFR BLD AUTO: 0 % (ref 0–5)
IRON SATN MFR SERPL: 46 % (ref 20–50)
IRON SERPL-MCNC: 117 UG/DL (ref 35–100)
KETONES UR QL STRIP.AUTO: ABNORMAL MG/DL
LEUKOCYTE ESTERASE UR QL STRIP.AUTO: ABNORMAL
LYMPHOCYTES # BLD: 1.9 K/UL (ref 0.5–4.6)
LYMPHOCYTES NFR BLD: 38 % (ref 13–44)
MCH RBC QN AUTO: 31.3 PG (ref 26.1–32.9)
MCH RBC QN AUTO: 31.8 PG (ref 26.1–32.9)
MCHC RBC AUTO-ENTMCNC: 31.1 G/DL (ref 31.4–35)
MCHC RBC AUTO-ENTMCNC: 31.7 G/DL (ref 31.4–35)
MCV RBC AUTO: 100.3 FL (ref 82–102)
MCV RBC AUTO: 100.6 FL (ref 82–102)
MONOCYTES # BLD: 0.4 K/UL (ref 0.1–1.3)
MONOCYTES NFR BLD: 7 % (ref 4–12)
MUCOUS THREADS URNS QL MICRO: 0 /LPF
NEUTS SEG # BLD: 2.7 K/UL (ref 1.7–8.2)
NEUTS SEG NFR BLD: 54 % (ref 43–78)
NITRITE UR QL STRIP.AUTO: NEGATIVE
NRBC # BLD: 0 K/UL (ref 0–0.2)
NRBC # BLD: 0 K/UL (ref 0–0.2)
OTHER OBSERVATIONS: ABNORMAL
PH UR STRIP: 5 (ref 5–9)
PLATELET # BLD AUTO: 251 K/UL (ref 150–450)
PLATELET # BLD AUTO: 265 K/UL (ref 150–450)
PMV BLD AUTO: 11.5 FL (ref 9.4–12.3)
PMV BLD AUTO: 11.8 FL (ref 9.4–12.3)
POTASSIUM SERPL-SCNC: 3.3 MMOL/L (ref 3.5–5.1)
PROT SERPL-MCNC: 6.7 G/DL (ref 6.3–8.2)
PROT UR STRIP-MCNC: NEGATIVE MG/DL
RBC # BLD AUTO: 3.46 M/UL (ref 4.05–5.2)
RBC # BLD AUTO: 3.48 M/UL (ref 4.05–5.2)
RBC #/AREA URNS HPF: ABNORMAL /HPF
SODIUM SERPL-SCNC: 143 MMOL/L (ref 136–145)
SP GR UR REFRACTOMETRY: 1.02 (ref 1–1.02)
TIBC SERPL-MCNC: 252 UG/DL (ref 240–450)
UIBC SERPL-MCNC: 135 UG/DL (ref 112–347)
URINE CULTURE IF INDICATED: ABNORMAL
UROBILINOGEN UR QL STRIP.AUTO: 0.2 EU/DL (ref 0.2–1)
WBC # BLD AUTO: 5.1 K/UL (ref 4.3–11.1)
WBC # BLD AUTO: 5.2 K/UL (ref 4.3–11.1)
WBC URNS QL MICRO: ABNORMAL /HPF

## 2024-12-09 PROCEDURE — 85025 COMPLETE CBC W/AUTO DIFF WBC: CPT

## 2024-12-09 PROCEDURE — 83540 ASSAY OF IRON: CPT

## 2024-12-09 PROCEDURE — 82728 ASSAY OF FERRITIN: CPT

## 2024-12-09 PROCEDURE — 83550 IRON BINDING TEST: CPT

## 2024-12-09 PROCEDURE — 80053 COMPREHEN METABOLIC PANEL: CPT

## 2024-12-09 PROCEDURE — 36415 COLL VENOUS BLD VENIPUNCTURE: CPT

## 2024-12-10 LAB
25(OH)D3 SERPL-MCNC: 46.9 NG/ML (ref 30–100)
ALBUMIN SERPL-MCNC: 3.8 G/DL (ref 3.5–5)
ALBUMIN/GLOB SERPL: 1.5 (ref 1–1.9)
ALP SERPL-CCNC: 86 U/L (ref 35–104)
ALT SERPL-CCNC: 15 U/L (ref 8–45)
ANION GAP SERPL CALC-SCNC: 9 MMOL/L (ref 7–16)
AST SERPL-CCNC: 26 U/L (ref 15–37)
BILIRUB SERPL-MCNC: <0.2 MG/DL (ref 0–1.2)
BUN SERPL-MCNC: 13 MG/DL (ref 6–23)
CALCIUM SERPL-MCNC: 9.7 MG/DL (ref 8.8–10.2)
CCP IGA+IGG SERPL IA-ACNC: 7 UNITS (ref 0–19)
CENTROMERE B AB SER-ACNC: <0.2 AI (ref 0–0.9)
CHLORIDE SERPL-SCNC: 102 MMOL/L (ref 98–107)
CHROMATIN AB SERPL-ACNC: <0.2 AI (ref 0–0.9)
CO2 SERPL-SCNC: 32 MMOL/L (ref 20–29)
CREAT SERPL-MCNC: 0.67 MG/DL (ref 0.6–1.1)
DSDNA AB SER-ACNC: <1 IU/ML (ref 0–9)
ENA JO1 AB SER-ACNC: <0.2 AI (ref 0–0.9)
ENA RNP AB SER-ACNC: <0.2 AI (ref 0–0.9)
ENA SCL70 AB SER-ACNC: <0.2 AI (ref 0–0.9)
ENA SM AB SER-ACNC: <0.2 AI (ref 0–0.9)
ENA SS-A AB SER-ACNC: 1.4 AI (ref 0–0.9)
ENA SS-B AB SER-ACNC: <0.2 AI (ref 0–0.9)
GLOBULIN SER CALC-MCNC: 2.6 G/DL (ref 2.3–3.5)
GLUCOSE SERPL-MCNC: 88 MG/DL (ref 70–99)
Lab: ABNORMAL
POTASSIUM SERPL-SCNC: 3.3 MMOL/L (ref 3.5–5.1)
PROT SERPL-MCNC: 6.4 G/DL (ref 6.3–8.2)
RHEUMATOID FACT SER QL LA: NEGATIVE
SODIUM SERPL-SCNC: 143 MMOL/L (ref 136–145)

## 2024-12-11 LAB
C3 SERPL-MCNC: 118 MG/DL (ref 82–167)
C4 SERPL-MCNC: 22 MG/DL (ref 12–38)

## 2024-12-12 ENCOUNTER — HOSPITAL ENCOUNTER (OUTPATIENT)
Dept: LAB | Age: 54
Discharge: HOME | End: 2024-12-12
Payer: COMMERCIAL

## 2024-12-12 ENCOUNTER — OFFICE VISIT (OUTPATIENT)
Dept: ONCOLOGY | Age: 54
End: 2024-12-12
Payer: COMMERCIAL

## 2024-12-12 VITALS
WEIGHT: 129.1 LBS | DIASTOLIC BLOOD PRESSURE: 82 MMHG | HEIGHT: 62 IN | HEART RATE: 78 BPM | OXYGEN SATURATION: 100 % | BODY MASS INDEX: 23.76 KG/M2 | TEMPERATURE: 97.5 F | SYSTOLIC BLOOD PRESSURE: 144 MMHG | RESPIRATION RATE: 16 BRPM

## 2024-12-12 DIAGNOSIS — D64.9 ANEMIA, UNSPECIFIED TYPE: ICD-10-CM

## 2024-12-12 DIAGNOSIS — D64.9 ANEMIA, UNSPECIFIED TYPE: Primary | ICD-10-CM

## 2024-12-12 LAB — PHOSPHATE SERPL-MCNC: 3.5 MG/DL (ref 2.5–4.5)

## 2024-12-12 PROCEDURE — 3077F SYST BP >= 140 MM HG: CPT | Performed by: INTERNAL MEDICINE

## 2024-12-12 PROCEDURE — 99214 OFFICE O/P EST MOD 30 MIN: CPT | Performed by: INTERNAL MEDICINE

## 2024-12-12 PROCEDURE — 36415 COLL VENOUS BLD VENIPUNCTURE: CPT

## 2024-12-12 PROCEDURE — 3079F DIAST BP 80-89 MM HG: CPT | Performed by: INTERNAL MEDICINE

## 2024-12-12 PROCEDURE — 84100 ASSAY OF PHOSPHORUS: CPT

## 2024-12-12 RX ORDER — POTASSIUM CHLORIDE 1500 MG/1
20 TABLET, EXTENDED RELEASE ORAL DAILY
Qty: 30 TABLET | Refills: 1 | Status: SHIPPED | OUTPATIENT
Start: 2024-12-12 | End: 2025-02-10

## 2024-12-12 RX ORDER — POTASSIUM CHLORIDE 1500 MG/1
20 TABLET, EXTENDED RELEASE ORAL DAILY
Qty: 90 TABLET | OUTPATIENT
Start: 2024-12-12 | End: 2025-02-10

## 2024-12-12 ASSESSMENT — PATIENT HEALTH QUESTIONNAIRE - PHQ9
2. FEELING DOWN, DEPRESSED OR HOPELESS: NOT AT ALL
SUM OF ALL RESPONSES TO PHQ QUESTIONS 1-9: 0
1. LITTLE INTEREST OR PLEASURE IN DOING THINGS: NOT AT ALL
SUM OF ALL RESPONSES TO PHQ9 QUESTIONS 1 & 2: 0
SUM OF ALL RESPONSES TO PHQ QUESTIONS 1-9: 0

## 2024-12-12 NOTE — PATIENT INSTRUCTIONS
Patient Information from Today's Visit    Diagnosis: Anemia      Follow Up Instructions:   - Labs reviewed  - Prescription for potassium routed to pharmacy, take as discussed during office visit. Follow up with primary care regarding low potassium level.    Follow up in 3 months with labs prior    Treatment Summary has been discussed and given to patient: N/A      Current Labs:   No visits with results within 3 Day(s) from this visit.   Latest known visit with results is:   Hospital Outpatient Visit on 12/09/2024   Component Date Value Ref Range Status    WBC 12/09/2024 5.1  4.3 - 11.1 K/uL Final    RBC 12/09/2024 3.46 (L)  4.05 - 5.2 M/uL Final    Hemoglobin 12/09/2024 11.0 (L)  11.7 - 15.4 g/dL Final    Hematocrit 12/09/2024 34.7 (L)  35.8 - 46.3 % Final    MCV 12/09/2024 100.3  82.0 - 102.0 FL Final    MCH 12/09/2024 31.8  26.1 - 32.9 PG Final    MCHC 12/09/2024 31.7  31.4 - 35.0 g/dL Final    RDW 12/09/2024 19.4 (H)  11.9 - 14.6 % Final    Platelets 12/09/2024 251  150 - 450 K/uL Final    MPV 12/09/2024 11.5  9.4 - 12.3 FL Final    nRBC 12/09/2024 0.00  0.0 - 0.2 K/uL Final    **Note: Absolute NRBC parameter is now reported with Hemogram**    Neutrophils % 12/09/2024 54  43 - 78 % Final    Lymphocytes % 12/09/2024 38  13 - 44 % Final    Monocytes % 12/09/2024 7  4.0 - 12.0 % Final    Eosinophils % 12/09/2024 1  0.5 - 7.8 % Final    Basophils % 12/09/2024 0  0.0 - 2.0 % Final    Immature Granulocytes % 12/09/2024 0  0.0 - 5.0 % Final    Neutrophils Absolute 12/09/2024 2.7  1.7 - 8.2 K/UL Final    Lymphocytes Absolute 12/09/2024 1.9  0.5 - 4.6 K/UL Final    Monocytes Absolute 12/09/2024 0.4  0.1 - 1.3 K/UL Final    Eosinophils Absolute 12/09/2024 0.1  0.0 - 0.8 K/UL Final    Basophils Absolute 12/09/2024 0.0  0.0 - 0.2 K/UL Final    Immature Granulocytes Absolute 12/09/2024 0.0  0.0 - 0.5 K/UL Final    Differential Type 12/09/2024 AUTOMATED    Final    Sodium 12/09/2024 143  136 - 145 mmol/L Final    Potassium

## 2024-12-12 NOTE — PROGRESS NOTES
[facility]: Office Visit Established Patient    Reason for follow up:    Anemia, unspecified type; Inflammatory polyarthritis     Overview: (copied from prior)  9.26.24: Ms. Ross is a 54 years old female patient with medical problems including Sjogren syndrome and inflammatory polyarthritis was been referred for evaluation and management of persistent anemia.  She is on methotrexate.  Review of outside labs indicates that her hemoglobin has been moderately low since at least August 2023.  Labs on 3/27/2024 noted hemoglobin of 10.5, hematocrit of 32.9, platelet count of 312 and WBC count of 5.33.Last colonoscopy in July 2023: Normal to the cecum however prep limited, repeat colonoscopy in 3 to 4 years was recommended.  Last EGD in June 2024: Normal EGD, esophageal dilation was performed.  Patient is accompanied by her mother during today's visit.  Patient denies any nausea, vomiting, hematemesis, melena, BRBPR, RLS, pica, chest pain, palpitations or dizziness.  She reports persistent fatigue.  Denies drenching night sweats,fevers, unintentional weight loss.  There has been no recent change in chronic inflammatory joint issues.      Interval history:    History of Present Illness  The patient is a 54-year-old female who presents for a follow-up regarding anemia in the context of inflammatory polyarthritis. She completed 2 doses of Feraheme on 11/22/2024 and is now here for a follow-up.    She reports experiencing severe indigestion, which has been a persistent issue for the past 3 months. Despite undergoing an endoscopy, no abnormalities were detected. However, she notes a progressive worsening of her symptoms, characterized by a burning sensation and regurgitation.  Her diet does not seem to influence her symptoms. She has contacted Dr. Castorena' office to inquire about potential medication-related causes of her symptoms. She is not currently taking sucralfate but is on Protonix. Her sleep

## 2024-12-17 NOTE — PROGRESS NOTES
Treatment plan was discussed in detail with patient. Agreement and understanding of the plan was verbalized by the patient.   Total visit time 41 minutes, greater than half of the time was spent on counseling.        Plan:         1.    effexor  150mg daily for now   2.  Continue lyrica   but please remember to take all 150mg in pm before 6 PM, after 5 days if significant flaring of pain in the legs/arms/side of back can take 4 pills which is 200 mg  3.  continue mtx  15mg SQ once weekly with daily folic acid 2mg   4.  Tylenol 1000mg every 6 hrs as needed for pain  5.  Salagen 5mg upto every 3 times a day as needed   6.   Prednisone 5 mg once daily as needed    7.  Continue vitamin D 2000 units daily  8.  Continue Protonix to 40 mg twice daily  9.  Can take famotidine 10 mg twice daily  10.  RTC in 4 months- call if any issues - with cbc, cmp, magdalena, lupus panel , c3, c4, UA , vit d and ESR

## 2024-12-18 ENCOUNTER — OFFICE VISIT (OUTPATIENT)
Dept: RHEUMATOLOGY | Age: 54
End: 2024-12-18
Payer: COMMERCIAL

## 2024-12-18 VITALS
BODY MASS INDEX: 23.74 KG/M2 | WEIGHT: 129 LBS | HEIGHT: 62 IN | DIASTOLIC BLOOD PRESSURE: 81 MMHG | HEART RATE: 77 BPM | SYSTOLIC BLOOD PRESSURE: 134 MMHG

## 2024-12-18 DIAGNOSIS — Z79.899 LONG TERM CURRENT USE OF IMMUNOSUPPRESSIVE DRUG: ICD-10-CM

## 2024-12-18 DIAGNOSIS — Z79.52 LONG TERM (CURRENT) USE OF SYSTEMIC STEROIDS: ICD-10-CM

## 2024-12-18 DIAGNOSIS — D64.9 ANEMIA, UNSPECIFIED TYPE: ICD-10-CM

## 2024-12-18 DIAGNOSIS — K21.9 CHRONIC GASTROESOPHAGEAL REFLUX DISEASE: ICD-10-CM

## 2024-12-18 DIAGNOSIS — R76.0 ANTINUCLEAR ANTIBODY (ANA) TITER GREATER THAN 1:80: ICD-10-CM

## 2024-12-18 DIAGNOSIS — E55.9 VITAMIN D DEFICIENCY, UNSPECIFIED: ICD-10-CM

## 2024-12-18 DIAGNOSIS — G62.9 NEUROPATHY: ICD-10-CM

## 2024-12-18 DIAGNOSIS — Z79.899 OTHER LONG TERM (CURRENT) DRUG THERAPY: ICD-10-CM

## 2024-12-18 DIAGNOSIS — R53.83 OTHER FATIGUE: ICD-10-CM

## 2024-12-18 DIAGNOSIS — M54.16 RADICULOPATHY, LUMBAR REGION: ICD-10-CM

## 2024-12-18 DIAGNOSIS — Z79.899 HIGH RISK MEDICATION USE: ICD-10-CM

## 2024-12-18 DIAGNOSIS — R89.4 SEROLOGIC ABNORMALITY: ICD-10-CM

## 2024-12-18 DIAGNOSIS — M06.4 INFLAMMATORY POLYARTHRITIS (HCC): Primary | ICD-10-CM

## 2024-12-18 PROCEDURE — 99215 OFFICE O/P EST HI 40 MIN: CPT | Performed by: INTERNAL MEDICINE

## 2024-12-18 PROCEDURE — 3075F SYST BP GE 130 - 139MM HG: CPT | Performed by: INTERNAL MEDICINE

## 2024-12-18 PROCEDURE — 3079F DIAST BP 80-89 MM HG: CPT | Performed by: INTERNAL MEDICINE

## 2024-12-18 RX ORDER — VENLAFAXINE HYDROCHLORIDE 150 MG/1
150 CAPSULE, EXTENDED RELEASE ORAL DAILY
Qty: 90 CAPSULE | Refills: 1 | Status: SHIPPED | OUTPATIENT
Start: 2024-12-18

## 2024-12-18 RX ORDER — PREGABALIN 50 MG/1
CAPSULE ORAL
Qty: 360 CAPSULE | Refills: 1 | Status: SHIPPED | OUTPATIENT
Start: 2024-12-18 | End: 2025-12-16

## 2024-12-18 RX ORDER — FOLIC ACID 1 MG/1
2 TABLET ORAL DAILY
Qty: 180 TABLET | Refills: 1 | Status: SHIPPED | OUTPATIENT
Start: 2024-12-18

## 2024-12-18 RX ORDER — PANTOPRAZOLE SODIUM 40 MG/1
40 TABLET, DELAYED RELEASE ORAL 2 TIMES DAILY
Qty: 180 TABLET | Refills: 1 | Status: SHIPPED | OUTPATIENT
Start: 2024-12-18

## 2024-12-18 RX ORDER — FAMOTIDINE 10 MG
10 TABLET ORAL 2 TIMES DAILY
Qty: 180 TABLET | Refills: 1 | Status: SHIPPED | OUTPATIENT
Start: 2024-12-18

## 2024-12-18 RX ORDER — PILOCARPINE HYDROCHLORIDE 5 MG/1
TABLET, FILM COATED ORAL
Qty: 270 TABLET | Refills: 1 | Status: SHIPPED | OUTPATIENT
Start: 2024-12-18

## 2024-12-18 RX ORDER — PREDNISONE 5 MG/1
TABLET ORAL
Qty: 90 TABLET | Refills: 1 | Status: SHIPPED | OUTPATIENT
Start: 2024-12-18

## 2024-12-18 NOTE — PATIENT INSTRUCTIONS
1.    effexor  150mg daily for now   2.  Continue lyrica   but please remember to take all 150mg in pm before 6 PM, after 5 days if significant flaring of pain in the legs/arms/side of back can take 4 pills which is 200 mg  3.  continue mtx  15mg SQ once weekly with daily folic acid 2mg   4.  Tylenol 1000mg every 6 hrs as needed for pain  5.  Salagen 5mg upto every 3 times a day as needed   6.   Prednisone 5 mg once daily as needed    7.  Continue vitamin D 2000 units daily  8.  Continue Protonix to 40 mg twice daily  9.  Can take famotidine 10 mg twice daily  10.  RTC in 4 months- call if any issues - with cbc, cmp, magdalena, lupus panel , c3, c4, UA , vit d and ESR      4MO,Labs Anmed prior w pt

## 2025-03-04 ENCOUNTER — TELEPHONE (OUTPATIENT)
Dept: ONCOLOGY | Age: 55
End: 2025-03-04

## 2025-04-03 ENCOUNTER — HOSPITAL ENCOUNTER (OUTPATIENT)
Dept: LAB | Age: 55
Discharge: HOME | End: 2025-04-03
Payer: COMMERCIAL

## 2025-04-03 DIAGNOSIS — Z79.52 LONG TERM (CURRENT) USE OF SYSTEMIC STEROIDS: ICD-10-CM

## 2025-04-03 DIAGNOSIS — G62.9 NEUROPATHY: ICD-10-CM

## 2025-04-03 DIAGNOSIS — R53.83 OTHER FATIGUE: ICD-10-CM

## 2025-04-03 DIAGNOSIS — Z79.899 HIGH RISK MEDICATION USE: ICD-10-CM

## 2025-04-03 DIAGNOSIS — R76.0 ANTINUCLEAR ANTIBODY (ANA) TITER GREATER THAN 1:80: ICD-10-CM

## 2025-04-03 DIAGNOSIS — E55.9 HYPOVITAMINOSIS D: ICD-10-CM

## 2025-04-03 DIAGNOSIS — M06.4 INFLAMMATORY POLYARTHRITIS (HCC): ICD-10-CM

## 2025-04-03 DIAGNOSIS — Z79.60 LONG TERM CURRENT USE OF IMMUNOSUPPRESSIVE DRUG: ICD-10-CM

## 2025-04-03 DIAGNOSIS — R89.4 SEROLOGIC ABNORMALITY: ICD-10-CM

## 2025-04-03 DIAGNOSIS — E55.9 VITAMIN D DEFICIENCY, UNSPECIFIED: ICD-10-CM

## 2025-04-03 DIAGNOSIS — D64.9 ANEMIA, UNSPECIFIED TYPE: ICD-10-CM

## 2025-04-03 LAB
25(OH)D3 SERPL-MCNC: 40.8 NG/ML (ref 30–100)
ALBUMIN SERPL-MCNC: 3.9 G/DL (ref 3.5–5)
ALBUMIN SERPL-MCNC: 4 G/DL (ref 3.5–5)
ALBUMIN/GLOB SERPL: 1.3 (ref 1–1.9)
ALBUMIN/GLOB SERPL: 1.4 (ref 1–1.9)
ALP SERPL-CCNC: 84 U/L (ref 35–104)
ALP SERPL-CCNC: 86 U/L (ref 35–104)
ALT SERPL-CCNC: 15 U/L (ref 8–45)
ALT SERPL-CCNC: 18 U/L (ref 8–45)
ANION GAP SERPL CALC-SCNC: 9 MMOL/L (ref 7–16)
ANION GAP SERPL CALC-SCNC: 9 MMOL/L (ref 7–16)
AST SERPL-CCNC: 28 U/L (ref 15–37)
AST SERPL-CCNC: 29 U/L (ref 15–37)
BASOPHILS # BLD: 0.02 K/UL (ref 0–0.2)
BASOPHILS NFR BLD: 0.4 % (ref 0–2)
BILIRUB SERPL-MCNC: 0.5 MG/DL (ref 0–1.2)
BILIRUB SERPL-MCNC: 0.5 MG/DL (ref 0–1.2)
BUN SERPL-MCNC: 14 MG/DL (ref 6–23)
BUN SERPL-MCNC: 15 MG/DL (ref 6–23)
CALCIUM SERPL-MCNC: 10 MG/DL (ref 8.8–10.2)
CALCIUM SERPL-MCNC: 9.7 MG/DL (ref 8.8–10.2)
CHLORIDE SERPL-SCNC: 100 MMOL/L (ref 98–107)
CHLORIDE SERPL-SCNC: 99 MMOL/L (ref 98–107)
CO2 SERPL-SCNC: 30 MMOL/L (ref 20–29)
CO2 SERPL-SCNC: 32 MMOL/L (ref 20–29)
CREAT SERPL-MCNC: 0.62 MG/DL (ref 0.6–1.1)
CREAT SERPL-MCNC: 0.67 MG/DL (ref 0.6–1.1)
DIFFERENTIAL METHOD BLD: ABNORMAL
EOSINOPHIL # BLD: 0.08 K/UL (ref 0–0.8)
EOSINOPHIL NFR BLD: 1.5 % (ref 0.5–7.8)
ERYTHROCYTE [DISTWIDTH] IN BLOOD BY AUTOMATED COUNT: 13.8 % (ref 11.9–14.6)
ERYTHROCYTE [DISTWIDTH] IN BLOOD BY AUTOMATED COUNT: 13.9 % (ref 11.9–14.6)
ERYTHROCYTE [SEDIMENTATION RATE] IN BLOOD: 3 MM/HR (ref 0–30)
FERRITIN SERPL-MCNC: 203 NG/ML (ref 8–388)
GLOBULIN SER CALC-MCNC: 2.8 G/DL (ref 2.3–3.5)
GLOBULIN SER CALC-MCNC: 3.1 G/DL (ref 2.3–3.5)
GLUCOSE SERPL-MCNC: 64 MG/DL (ref 70–99)
GLUCOSE SERPL-MCNC: 64 MG/DL (ref 70–99)
HCT VFR BLD AUTO: 35.3 % (ref 35.8–46.3)
HCT VFR BLD AUTO: 36.2 % (ref 35.8–46.3)
HGB BLD-MCNC: 12 G/DL (ref 11.7–15.4)
HGB BLD-MCNC: 12 G/DL (ref 11.7–15.4)
IMM GRANULOCYTES # BLD AUTO: 0.02 K/UL (ref 0–0.5)
IMM GRANULOCYTES NFR BLD AUTO: 0.4 % (ref 0–5)
IRON SATN MFR SERPL: 52 % (ref 20–50)
IRON SERPL-MCNC: 133 UG/DL (ref 35–100)
LYMPHOCYTES # BLD: 2.26 K/UL (ref 0.5–4.6)
LYMPHOCYTES NFR BLD: 41.4 % (ref 13–44)
MCH RBC QN AUTO: 34.6 PG (ref 26.1–32.9)
MCH RBC QN AUTO: 34.6 PG (ref 26.1–32.9)
MCHC RBC AUTO-ENTMCNC: 33.1 G/DL (ref 31.4–35)
MCHC RBC AUTO-ENTMCNC: 34 G/DL (ref 31.4–35)
MCV RBC AUTO: 101.7 FL (ref 82–102)
MCV RBC AUTO: 104.3 FL (ref 82–102)
MONOCYTES # BLD: 0.37 K/UL (ref 0.1–1.3)
MONOCYTES NFR BLD: 6.8 % (ref 4–12)
NEUTS SEG # BLD: 2.71 K/UL (ref 1.7–8.2)
NEUTS SEG NFR BLD: 49.5 % (ref 43–78)
NRBC # BLD: 0 K/UL (ref 0–0.2)
NRBC # BLD: 0 K/UL (ref 0–0.2)
PLATELET # BLD AUTO: 264 K/UL (ref 150–450)
PLATELET # BLD AUTO: 266 K/UL (ref 150–450)
PMV BLD AUTO: 11.6 FL (ref 9.4–12.3)
PMV BLD AUTO: 12 FL (ref 9.4–12.3)
POTASSIUM SERPL-SCNC: 3.3 MMOL/L (ref 3.5–5.1)
POTASSIUM SERPL-SCNC: 3.7 MMOL/L (ref 3.5–5.1)
PROT SERPL-MCNC: 6.8 G/DL (ref 6.3–8.2)
PROT SERPL-MCNC: 7 G/DL (ref 6.3–8.2)
RBC # BLD AUTO: 3.47 M/UL (ref 4.05–5.2)
RBC # BLD AUTO: 3.47 M/UL (ref 4.05–5.2)
SODIUM SERPL-SCNC: 138 MMOL/L (ref 136–145)
SODIUM SERPL-SCNC: 140 MMOL/L (ref 136–145)
TIBC SERPL-MCNC: 257 UG/DL (ref 240–450)
UIBC SERPL-MCNC: 124 UG/DL (ref 112–347)
WBC # BLD AUTO: 5.5 K/UL (ref 4.3–11.1)
WBC # BLD AUTO: 5.5 K/UL (ref 4.3–11.1)

## 2025-04-03 PROCEDURE — 82728 ASSAY OF FERRITIN: CPT

## 2025-04-03 PROCEDURE — 83550 IRON BINDING TEST: CPT

## 2025-04-03 PROCEDURE — 36415 COLL VENOUS BLD VENIPUNCTURE: CPT

## 2025-04-03 PROCEDURE — 85025 COMPLETE CBC W/AUTO DIFF WBC: CPT

## 2025-04-03 PROCEDURE — 83540 ASSAY OF IRON: CPT

## 2025-04-03 PROCEDURE — 80053 COMPREHEN METABOLIC PANEL: CPT

## 2025-04-03 NOTE — PROGRESS NOTES
200 mg qd 360 capsule 1    venlafaxine (EFFEXOR XR) 150 MG extended release capsule Take 1 capsule by mouth daily 90 capsule 1    predniSONE (DELTASONE) 5 MG tablet 5mg daily as needed 90 tablet 1    pilocarpine (SALAGEN) 5 MG tablet Salagen 5mg upto every 3 times a day as needed 270 tablet 1    hydroCHLOROthiazide (HYDRODIURIL) 25 MG tablet Take 1 tablet by mouth daily 90 tablet 3    omeprazole (PRILOSEC) 20 MG delayed release capsule Take 1 capsule by mouth daily      acetaminophen (TYLENOL) 500 MG tablet Take by mouth every 6 hours as needed      potassium chloride (KLOR-CON M) 20 MEQ extended release tablet Take 1 tablet by mouth daily 30 tablet 1    predniSONE (DELTASONE) 5 MG tablet Prednisone 5 mg once daily for the next week then take 5mg qd as needed (Patient not taking: Reported on 12/18/2024) 100 tablet 0    Probiotic Product (PROBIOTIC DAILY PO) Take by mouth daily (Patient not taking: Reported on 4/8/2025)      sucralfate (CARAFATE) 1 GM tablet in the morning and at bedtime      Cholecalciferol (VITAMIN D3 GUMMIES PO) Take by mouth 2000 qd (Patient not taking: Reported on 12/18/2024)       No current facility-administered medications for this visit.        OBJECTIVE:  BP (!) 143/79 (BP Site: Left Upper Arm, Patient Position: Sitting)   Pulse 74   Temp 98 °F (36.7 °C) (Oral)   Resp 16   Ht 1.575 m (5' 2\")   Wt 59.9 kg (132 lb)   SpO2 100%   BMI 24.14 kg/m²   Wt Readings from Last 3 Encounters:   04/08/25 59.9 kg (132 lb)   12/18/24 58.5 kg (129 lb)   12/12/24 58.6 kg (129 lb 1.6 oz)       Physical Exam:  Patient alert and oriented x 3, in no acute distress  Integumentary: No Pallor, no icterus  HEENT: moist mucous membranes, normal oropharynx  Lymph nodes: No palpable cervical, axillary or inguinal lymphadenopathy  Cardiovascular:RRR, S1, S2 present, no m/r/g   Lungs: Clear to auscultation, no rales or wheezing, no accessory muscle use  Abdomen: Soft, and non-tender on palpation, no organomegaly,

## 2025-04-04 LAB
CCP IGA+IGG SERPL IA-ACNC: 6 UNITS (ref 0–19)
CENTROMERE B AB SER-ACNC: <0.2 AI (ref 0–0.9)
CHROMATIN AB SERPL-ACNC: <0.2 AI (ref 0–0.9)
DSDNA AB SER-ACNC: <1 IU/ML (ref 0–9)
ENA JO1 AB SER-ACNC: <0.2 AI (ref 0–0.9)
ENA RNP AB SER-ACNC: <0.2 AI (ref 0–0.9)
ENA SCL70 AB SER-ACNC: <0.2 AI (ref 0–0.9)
ENA SM AB SER-ACNC: <0.2 AI (ref 0–0.9)
ENA SS-A AB SER-ACNC: 1.3 AI (ref 0–0.9)
ENA SS-B AB SER-ACNC: <0.2 AI (ref 0–0.9)
Lab: ABNORMAL
RHEUMATOID FACT SER QL LA: NEGATIVE

## 2025-04-05 LAB
C3 SERPL-MCNC: 126 MG/DL (ref 82–167)
C4 SERPL-MCNC: 19 MG/DL (ref 12–38)

## 2025-04-08 ENCOUNTER — OFFICE VISIT (OUTPATIENT)
Dept: ONCOLOGY | Age: 55
End: 2025-04-08
Payer: COMMERCIAL

## 2025-04-08 VITALS
DIASTOLIC BLOOD PRESSURE: 79 MMHG | WEIGHT: 132 LBS | TEMPERATURE: 98 F | HEIGHT: 62 IN | HEART RATE: 74 BPM | SYSTOLIC BLOOD PRESSURE: 143 MMHG | OXYGEN SATURATION: 100 % | RESPIRATION RATE: 16 BRPM | BODY MASS INDEX: 24.29 KG/M2

## 2025-04-08 DIAGNOSIS — D64.9 ANEMIA, UNSPECIFIED TYPE: Primary | ICD-10-CM

## 2025-04-08 PROCEDURE — 3078F DIAST BP <80 MM HG: CPT | Performed by: INTERNAL MEDICINE

## 2025-04-08 PROCEDURE — 99213 OFFICE O/P EST LOW 20 MIN: CPT | Performed by: INTERNAL MEDICINE

## 2025-04-08 PROCEDURE — 3077F SYST BP >= 140 MM HG: CPT | Performed by: INTERNAL MEDICINE

## 2025-04-08 RX ORDER — VALSARTAN 80 MG/1
80 TABLET ORAL DAILY
COMMUNITY

## 2025-04-08 RX ORDER — BUPROPION HYDROCHLORIDE 150 MG/1
150 TABLET ORAL DAILY
COMMUNITY
Start: 2025-02-11 | End: 2026-02-11

## 2025-04-08 ASSESSMENT — PATIENT HEALTH QUESTIONNAIRE - PHQ9
2. FEELING DOWN, DEPRESSED OR HOPELESS: NOT AT ALL
SUM OF ALL RESPONSES TO PHQ QUESTIONS 1-9: 0
1. LITTLE INTEREST OR PLEASURE IN DOING THINGS: NOT AT ALL
SUM OF ALL RESPONSES TO PHQ QUESTIONS 1-9: 0

## 2025-04-22 NOTE — PROGRESS NOTES
Subjective:           History of Present Illness  Permanent History: Mrs dixon is a very pleasant 55  lady-year-old lady with past medical history of chronic pain, Sjogren's syndrome, GERD, anemia who presents for evaluation of her Sjogren's.  Patient previously seeing dr francisco, for longstanding has several pain complaints, from bilateral shoulder and knee pain in addition to hands, no swelling or redness -was treated with tramadol - and takes it approximately once or twice daily. She was first diagnosed with Sjogren's many years ago when she had elevated serologies in addition to multiple pain complaints.  Her anemia has been pretty much constant for the past few years at one time dropping so low she required iron infusions. She has been seen by Dr. Ignacio in the past with a colonoscopy that was negative., Patient was seen by me for a few years until my move has been followed locally since then.  \"Update since then has been fairly quiescent symptoms for a couple of years but more recently increasing pain, stiffness, especially in the mornings with tingling numbness in the hands and feet.  Also significant increasing pain in neck, was referred to surgeon underwent shots in the neck and lower back.  Spinal surgeon referred to pain management.  Neck and back pain and hand pain. Hands hurt all the time. Will wake up in the middle of the night with hand drawing. Seeing foot doctor, getting shots in feet cortisone, which does help for a few weeks. Feet hurt when she walks, has a sharp shooting pain that goes up the legs and into back. Was thinking about going to chiropactor but scared. Has really bad stiffness in the morning and anytime she stands up when she is sittingl having heel soreness. For the past year, this pain is getting gradually worse.the owrst pain at ngiht and first thing in the morning. Not taking plaquneil. Just taking lyrica - takes 100mg at night- cant take during the day bc ot makes her

## 2025-04-23 ENCOUNTER — OFFICE VISIT (OUTPATIENT)
Dept: RHEUMATOLOGY | Age: 55
End: 2025-04-23

## 2025-04-23 VITALS
HEART RATE: 78 BPM | WEIGHT: 128 LBS | BODY MASS INDEX: 23.55 KG/M2 | SYSTOLIC BLOOD PRESSURE: 154 MMHG | DIASTOLIC BLOOD PRESSURE: 93 MMHG | HEIGHT: 62 IN

## 2025-04-23 DIAGNOSIS — Z79.60 LONG TERM CURRENT USE OF IMMUNOSUPPRESSIVE DRUG: ICD-10-CM

## 2025-04-23 DIAGNOSIS — Z79.899 HIGH RISK MEDICATION USE: ICD-10-CM

## 2025-04-23 DIAGNOSIS — E55.9 VITAMIN D DEFICIENCY, UNSPECIFIED: ICD-10-CM

## 2025-04-23 DIAGNOSIS — R76.0 ANTINUCLEAR ANTIBODY (ANA) TITER GREATER THAN 1:80: ICD-10-CM

## 2025-04-23 DIAGNOSIS — Z79.899 OTHER LONG TERM (CURRENT) DRUG THERAPY: ICD-10-CM

## 2025-04-23 DIAGNOSIS — K21.9 CHRONIC GASTROESOPHAGEAL REFLUX DISEASE: ICD-10-CM

## 2025-04-23 DIAGNOSIS — E55.9 HYPOVITAMINOSIS D: ICD-10-CM

## 2025-04-23 DIAGNOSIS — R89.4 SEROLOGIC ABNORMALITY: ICD-10-CM

## 2025-04-23 DIAGNOSIS — D64.9 ANEMIA, UNSPECIFIED TYPE: ICD-10-CM

## 2025-04-23 DIAGNOSIS — G62.9 NEUROPATHY: ICD-10-CM

## 2025-04-23 DIAGNOSIS — M35.05 SJOGREN'S SYNDROME WITH INFLAMMATORY ARTHRITIS: ICD-10-CM

## 2025-04-23 DIAGNOSIS — M06.4 INFLAMMATORY POLYARTHRITIS (HCC): Primary | ICD-10-CM

## 2025-04-23 DIAGNOSIS — R53.83 OTHER FATIGUE: ICD-10-CM

## 2025-04-23 DIAGNOSIS — M15.9 GENERALIZED OSTEOARTHRITIS: ICD-10-CM

## 2025-04-23 DIAGNOSIS — Z79.52 LONG TERM (CURRENT) USE OF SYSTEMIC STEROIDS: ICD-10-CM

## 2025-04-23 DIAGNOSIS — M54.16 RADICULOPATHY, LUMBAR REGION: ICD-10-CM

## 2025-04-23 NOTE — PATIENT INSTRUCTIONS
1.    effexor  150mg daily for now   2.  Continue lyrica   but please remember to take all 150mg in pm before 6 PM, after 5 days if significant flaring of pain in the legs/arms/side of back can take 4 pills which is 200 mg  3.  continue mtx  15mg SQ once weekly with daily folic acid 2mg   4.  Tylenol 1000mg every 6 hrs as needed for pain  5.  Salagen 5mg upto every 3 times a day as needed   6.   Prednisone 5 mg once daily as needed    7.  Continue vitamin D 2000 units daily  8.  Continue Protonix to 40 mg twice daily  9.  Can take famotidine 10 mg twice daily  10.  Referral for sleep study 55-year-old patient with inflammatory polyarthritis, on methotrexate previously anemic s/p infusions hemoglobin normal but continues to have persistent fatigue, poor sleep.  11.  RTC in 4 months- call if any issues - with cbc, cmp, magdalena, lupus panel , c3, c4, UA , vit d and ESR      4MO,Labs Anmed prior w pt,Sleep

## 2025-04-25 RX ORDER — PILOCARPINE HYDROCHLORIDE 5 MG/1
TABLET, FILM COATED ORAL
Qty: 270 TABLET | Refills: 1 | Status: SHIPPED | OUTPATIENT
Start: 2025-04-25

## 2025-04-25 RX ORDER — FOLIC ACID 1 MG/1
2 TABLET ORAL DAILY
Qty: 180 TABLET | Refills: 1 | Status: SHIPPED | OUTPATIENT
Start: 2025-04-25

## 2025-04-25 RX ORDER — FAMOTIDINE 10 MG
10 TABLET ORAL 2 TIMES DAILY
Qty: 180 TABLET | Refills: 1 | Status: SHIPPED | OUTPATIENT
Start: 2025-04-25

## 2025-04-25 RX ORDER — VENLAFAXINE HYDROCHLORIDE 150 MG/1
150 CAPSULE, EXTENDED RELEASE ORAL DAILY
Qty: 90 CAPSULE | Refills: 1 | Status: SHIPPED | OUTPATIENT
Start: 2025-04-25

## 2025-04-25 RX ORDER — PANTOPRAZOLE SODIUM 40 MG/1
40 TABLET, DELAYED RELEASE ORAL 2 TIMES DAILY
Qty: 180 TABLET | Refills: 1 | Status: SHIPPED | OUTPATIENT
Start: 2025-04-25

## 2025-04-25 RX ORDER — PREGABALIN 50 MG/1
CAPSULE ORAL
Qty: 360 CAPSULE | Refills: 1 | Status: SHIPPED | OUTPATIENT
Start: 2025-04-25 | End: 2026-04-21

## 2025-04-25 RX ORDER — PREDNISONE 5 MG/1
TABLET ORAL
Qty: 90 TABLET | Refills: 1 | Status: SHIPPED | OUTPATIENT
Start: 2025-04-25

## 2025-05-16 DIAGNOSIS — R06.83 SNORING: Primary | ICD-10-CM

## 2025-06-11 ENCOUNTER — HOSPITAL ENCOUNTER (OUTPATIENT)
Dept: SLEEP CENTER | Age: 55
Discharge: HOME | End: 2025-06-13

## 2025-06-21 DIAGNOSIS — I10 BENIGN ESSENTIAL HYPERTENSION: ICD-10-CM

## 2025-06-23 NOTE — TELEPHONE ENCOUNTER
Requested Prescriptions     Pending Prescriptions Disp Refills    hydroCHLOROthiazide (HYDRODIURIL) 25 MG tablet 60 tablet 0     Sig: Take 1 tablet by mouth daily        Verified rx. Last OV 8/13/24. Erx to pharm on file. Advised pt to make an appointment for follow up for more refills

## 2025-06-24 RX ORDER — HYDROCHLOROTHIAZIDE 25 MG/1
25 TABLET ORAL DAILY
Qty: 60 TABLET | Refills: 0 | Status: SHIPPED | OUTPATIENT
Start: 2025-06-24

## 2025-07-28 ENCOUNTER — OFFICE VISIT (OUTPATIENT)
Dept: SLEEP MEDICINE | Age: 55
End: 2025-07-28
Payer: COMMERCIAL

## 2025-07-28 VITALS
DIASTOLIC BLOOD PRESSURE: 84 MMHG | HEIGHT: 62 IN | WEIGHT: 128 LBS | OXYGEN SATURATION: 95 % | BODY MASS INDEX: 23.55 KG/M2 | SYSTOLIC BLOOD PRESSURE: 152 MMHG | HEART RATE: 80 BPM | RESPIRATION RATE: 17 BRPM

## 2025-07-28 DIAGNOSIS — K21.9 GASTROESOPHAGEAL REFLUX DISEASE, UNSPECIFIED WHETHER ESOPHAGITIS PRESENT: ICD-10-CM

## 2025-07-28 DIAGNOSIS — G47.10 HYPERSOMNOLENCE: ICD-10-CM

## 2025-07-28 DIAGNOSIS — G47.8 NON-RESTORATIVE SLEEP: ICD-10-CM

## 2025-07-28 DIAGNOSIS — G47.33 OSA (OBSTRUCTIVE SLEEP APNEA): Primary | ICD-10-CM

## 2025-07-28 DIAGNOSIS — R41.840 LACK OF CONCENTRATION: ICD-10-CM

## 2025-07-28 DIAGNOSIS — R61 NIGHT SWEATS: ICD-10-CM

## 2025-07-28 PROCEDURE — 3077F SYST BP >= 140 MM HG: CPT | Performed by: NURSE PRACTITIONER

## 2025-07-28 PROCEDURE — 3079F DIAST BP 80-89 MM HG: CPT | Performed by: NURSE PRACTITIONER

## 2025-07-28 PROCEDURE — 99203 OFFICE O/P NEW LOW 30 MIN: CPT | Performed by: NURSE PRACTITIONER

## 2025-07-28 ASSESSMENT — SLEEP AND FATIGUE QUESTIONNAIRES
HOW LIKELY ARE YOU TO NOD OFF OR FALL ASLEEP WHEN YOU ARE A PASSENGER IN A CAR FOR AN HOUR WITHOUT A BREAK: WOULD NEVER DOZE
HOW LIKELY ARE YOU TO NOD OFF OR FALL ASLEEP WHILE SITTING INACTIVE IN A PUBLIC PLACE: WOULD NEVER DOZE
ESS TOTAL SCORE: 7
HOW LIKELY ARE YOU TO NOD OFF OR FALL ASLEEP WHILE LYING DOWN TO REST IN THE AFTERNOON WHEN CIRCUMSTANCES PERMIT: HIGH CHANCE OF DOZING
HOW LIKELY ARE YOU TO NOD OFF OR FALL ASLEEP WHILE WATCHING TV: SLIGHT CHANCE OF DOZING
HOW LIKELY ARE YOU TO NOD OFF OR FALL ASLEEP WHILE SITTING AND READING: HIGH CHANCE OF DOZING
HOW LIKELY ARE YOU TO NOD OFF OR FALL ASLEEP WHILE SITTING AND TALKING TO SOMEONE: WOULD NEVER DOZE
HOW LIKELY ARE YOU TO NOD OFF OR FALL ASLEEP IN A CAR, WHILE STOPPED FOR A FEW MINUTES IN TRAFFIC: WOULD NEVER DOZE
HOW LIKELY ARE YOU TO NOD OFF OR FALL ASLEEP WHILE SITTING QUIETLY AFTER LUNCH WITHOUT ALCOHOL: WOULD NEVER DOZE

## 2025-07-28 NOTE — PATIENT INSTRUCTIONS
1. Mild sleep apnea.  Her symptoms of gastric reflux, daytime sleepiness, and concentration difficulties could be attributed to mild sleep apnea. The home sleep study revealed an average of 8 events per hour, indicating mild sleep apnea. Her oxygen levels remained within normal limits, with a minimum of 91 percent. Positional therapy was discussed as a potential treatment option. The use of a CPAP machine was also considered, along with the possibility of an oral appliance if CPAP proves intolerable. A prescription for a CPAP machine will be sent to [facility]. She is advised to continue with her current plan gastroenterology study.    2. Gastric reflux.  She reports experiencing gastric reflux frequently, both at night and during the day, despite taking three different medications. Sleep apnea can exacerbate gastric reflux. She is advised to continue her current medications and follow up with her gastroenterologist as needed.    Follow-up  A follow-up appointment is scheduled for 4 months from now.

## 2025-07-28 NOTE — PROGRESS NOTES
[facility]  [address]  [place] [address]  [phone]    Patient Name:  Anna Ross  YOB: 1970      Office Visit 7/28/2025    CHIEF COMPLAINT:    Chief Complaint   Patient presents with    New Patient    Sleep Apnea     History of Present Illness  The patient is a 55-year-old female who presents in outpatient consultation at the request of Dr. Castorena for management of obstructive sleep apnea.  Significant PMH of hypertension, GERD, palpitations, Sjogren syndrome, and anemia     The diagnostic polysomnography was notable for an apnea hypopnea index of 8.0 including 13 obstructive apneas and 35 hypopneas.  Oxygen desaturations are low as 91% were noted with SpO2 less than 89% for a total of 0.0 minutes of the test.  Significant cardiac arrhythmias were not evident.      She was referred by her gastroenterology physician due to persistent digestive issues. Her physician suggested a sleep study as a potential cause of her symptoms. She reports no reported history of snoring or family history of sleep apnea. She experiences frequent night sweats, often waking up drenched. Her sleep schedule typically involves going to bed at 11 PM and waking up at 6 AM, with no initial difficulty in falling asleep. However, she often wakes up at least once during the night and does not feel refreshed upon waking. She has not taken any sleep medications. She consumes small amounts of caffeine during the day, primarily from mini cans of Coke, and enjoys sweet tea with lemon during meals. She also reports difficulty concentrating and remembering words. She has back issues and hip pain, which she attributes to sleeping on her back, although she prefers to sleep on her side.    She has been experiencing severe gastric reflux, which she manages with three different medications throughout the day. This issue has been present since her youth but has recently worsened.    She

## 2025-08-12 ENCOUNTER — HOSPITAL ENCOUNTER (OUTPATIENT)
Dept: LAB | Age: 55
Discharge: HOME | End: 2025-08-12
Payer: COMMERCIAL

## 2025-08-12 ENCOUNTER — OFFICE VISIT (OUTPATIENT)
Dept: ONCOLOGY | Age: 55
End: 2025-08-12
Payer: COMMERCIAL

## 2025-08-12 VITALS
HEIGHT: 62 IN | OXYGEN SATURATION: 100 % | BODY MASS INDEX: 23.15 KG/M2 | SYSTOLIC BLOOD PRESSURE: 142 MMHG | TEMPERATURE: 97.8 F | WEIGHT: 125.8 LBS | HEART RATE: 69 BPM | DIASTOLIC BLOOD PRESSURE: 77 MMHG | RESPIRATION RATE: 16 BRPM

## 2025-08-12 DIAGNOSIS — K21.9 CHRONIC GASTROESOPHAGEAL REFLUX DISEASE: ICD-10-CM

## 2025-08-12 DIAGNOSIS — E55.9 HYPOVITAMINOSIS D: ICD-10-CM

## 2025-08-12 DIAGNOSIS — M15.9 GENERALIZED OSTEOARTHRITIS: ICD-10-CM

## 2025-08-12 DIAGNOSIS — Z79.60 LONG TERM CURRENT USE OF IMMUNOSUPPRESSIVE DRUG: ICD-10-CM

## 2025-08-12 DIAGNOSIS — M35.05 SJOGREN'S SYNDROME WITH INFLAMMATORY ARTHRITIS: ICD-10-CM

## 2025-08-12 DIAGNOSIS — Z79.899 OTHER LONG TERM (CURRENT) DRUG THERAPY: ICD-10-CM

## 2025-08-12 DIAGNOSIS — R53.83 OTHER FATIGUE: ICD-10-CM

## 2025-08-12 DIAGNOSIS — Z79.52 LONG TERM (CURRENT) USE OF SYSTEMIC STEROIDS: ICD-10-CM

## 2025-08-12 DIAGNOSIS — D64.9 ANEMIA, UNSPECIFIED TYPE: ICD-10-CM

## 2025-08-12 DIAGNOSIS — R76.0 ANTINUCLEAR ANTIBODY (ANA) TITER GREATER THAN 1:80: ICD-10-CM

## 2025-08-12 DIAGNOSIS — M06.4 INFLAMMATORY POLYARTHRITIS (HCC): ICD-10-CM

## 2025-08-12 LAB
25(OH)D3 SERPL-MCNC: 46 NG/ML (ref 30–100)
ALBUMIN SERPL-MCNC: 4.1 G/DL (ref 3.5–5)
ALBUMIN SERPL-MCNC: 4.1 G/DL (ref 3.5–5)
ALBUMIN/GLOB SERPL: 1.3 (ref 1–1.9)
ALBUMIN/GLOB SERPL: 1.4 (ref 1–1.9)
ALP SERPL-CCNC: 82 U/L (ref 35–104)
ALP SERPL-CCNC: 83 U/L (ref 35–104)
ALT SERPL-CCNC: 14 U/L (ref 8–45)
ALT SERPL-CCNC: 15 U/L (ref 8–45)
ANION GAP SERPL CALC-SCNC: 12 MMOL/L (ref 7–16)
ANION GAP SERPL CALC-SCNC: 13 MMOL/L (ref 7–16)
AST SERPL-CCNC: 29 U/L (ref 15–37)
AST SERPL-CCNC: 33 U/L (ref 15–37)
BASOPHILS # BLD: 0.01 K/UL (ref 0–0.2)
BASOPHILS NFR BLD: 0.2 % (ref 0–2)
BILIRUB SERPL-MCNC: 0.3 MG/DL (ref 0–1.2)
BILIRUB SERPL-MCNC: 0.3 MG/DL (ref 0–1.2)
BUN SERPL-MCNC: 14 MG/DL (ref 6–23)
BUN SERPL-MCNC: 14 MG/DL (ref 6–23)
CALCIUM SERPL-MCNC: 9.7 MG/DL (ref 8.8–10.2)
CALCIUM SERPL-MCNC: 9.8 MG/DL (ref 8.8–10.2)
CHLORIDE SERPL-SCNC: 101 MMOL/L (ref 98–107)
CHLORIDE SERPL-SCNC: 101 MMOL/L (ref 98–107)
CO2 SERPL-SCNC: 27 MMOL/L (ref 20–29)
CO2 SERPL-SCNC: 28 MMOL/L (ref 20–29)
CREAT SERPL-MCNC: 0.68 MG/DL (ref 0.6–1.1)
CREAT SERPL-MCNC: 0.68 MG/DL (ref 0.6–1.1)
DIFFERENTIAL METHOD BLD: ABNORMAL
EOSINOPHIL # BLD: 0.06 K/UL (ref 0–0.8)
EOSINOPHIL NFR BLD: 1.4 % (ref 0.5–7.8)
ERYTHROCYTE [DISTWIDTH] IN BLOOD BY AUTOMATED COUNT: 13.2 % (ref 11.9–14.6)
ERYTHROCYTE [DISTWIDTH] IN BLOOD BY AUTOMATED COUNT: 13.4 % (ref 11.9–14.6)
ERYTHROCYTE [SEDIMENTATION RATE] IN BLOOD: 6 MM/HR (ref 0–30)
FERRITIN SERPL-MCNC: 125 NG/ML (ref 8–388)
GLOBULIN SER CALC-MCNC: 2.9 G/DL (ref 2.3–3.5)
GLOBULIN SER CALC-MCNC: 3.1 G/DL (ref 2.3–3.5)
GLUCOSE SERPL-MCNC: 78 MG/DL (ref 70–99)
GLUCOSE SERPL-MCNC: 83 MG/DL (ref 70–99)
HCT VFR BLD AUTO: 37.2 % (ref 35.8–46.3)
HCT VFR BLD AUTO: 38.7 % (ref 35.8–46.3)
HGB BLD-MCNC: 12.8 G/DL (ref 11.7–15.4)
HGB BLD-MCNC: 12.8 G/DL (ref 11.7–15.4)
IMM GRANULOCYTES # BLD AUTO: 0.01 K/UL (ref 0–0.5)
IMM GRANULOCYTES NFR BLD AUTO: 0.2 % (ref 0–5)
IRON SATN MFR SERPL: 37 % (ref 20–50)
IRON SERPL-MCNC: 97 UG/DL (ref 35–100)
LYMPHOCYTES # BLD: 1.47 K/UL (ref 0.5–4.6)
LYMPHOCYTES NFR BLD: 35.4 % (ref 13–44)
MCH RBC QN AUTO: 35 PG (ref 26.1–32.9)
MCH RBC QN AUTO: 35.1 PG (ref 26.1–32.9)
MCHC RBC AUTO-ENTMCNC: 33.1 G/DL (ref 31.4–35)
MCHC RBC AUTO-ENTMCNC: 34.4 G/DL (ref 31.4–35)
MCV RBC AUTO: 101.9 FL (ref 82–102)
MCV RBC AUTO: 105.7 FL (ref 82–102)
MONOCYTES # BLD: 0.43 K/UL (ref 0.1–1.3)
MONOCYTES NFR BLD: 10.4 % (ref 4–12)
NEUTS SEG # BLD: 2.17 K/UL (ref 1.7–8.2)
NEUTS SEG NFR BLD: 52.4 % (ref 43–78)
NRBC # BLD: 0 K/UL (ref 0–0.2)
NRBC # BLD: 0 K/UL (ref 0–0.2)
PLATELET # BLD AUTO: 255 K/UL (ref 150–450)
PLATELET # BLD AUTO: 258 K/UL (ref 150–450)
PMV BLD AUTO: 11.8 FL (ref 9.4–12.3)
PMV BLD AUTO: 12.4 FL (ref 9.4–12.3)
POTASSIUM SERPL-SCNC: 3.4 MMOL/L (ref 3.5–5.1)
POTASSIUM SERPL-SCNC: 3.4 MMOL/L (ref 3.5–5.1)
PROT SERPL-MCNC: 7 G/DL (ref 6.3–8.2)
PROT SERPL-MCNC: 7.1 G/DL (ref 6.3–8.2)
RBC # BLD AUTO: 3.65 M/UL (ref 4.05–5.2)
RBC # BLD AUTO: 3.66 M/UL (ref 4.05–5.2)
SODIUM SERPL-SCNC: 140 MMOL/L (ref 136–145)
SODIUM SERPL-SCNC: 141 MMOL/L (ref 136–145)
TIBC SERPL-MCNC: 265 UG/DL (ref 240–450)
UIBC SERPL-MCNC: 168 UG/DL (ref 112–347)
WBC # BLD AUTO: 4 K/UL (ref 4.3–11.1)
WBC # BLD AUTO: 4.2 K/UL (ref 4.3–11.1)

## 2025-08-12 PROCEDURE — 80053 COMPREHEN METABOLIC PANEL: CPT

## 2025-08-12 PROCEDURE — 36415 COLL VENOUS BLD VENIPUNCTURE: CPT

## 2025-08-12 PROCEDURE — 3077F SYST BP >= 140 MM HG: CPT | Performed by: INTERNAL MEDICINE

## 2025-08-12 PROCEDURE — 3078F DIAST BP <80 MM HG: CPT | Performed by: INTERNAL MEDICINE

## 2025-08-12 PROCEDURE — 99213 OFFICE O/P EST LOW 20 MIN: CPT | Performed by: INTERNAL MEDICINE

## 2025-08-12 PROCEDURE — 82728 ASSAY OF FERRITIN: CPT

## 2025-08-12 PROCEDURE — 85025 COMPLETE CBC W/AUTO DIFF WBC: CPT

## 2025-08-12 PROCEDURE — 83540 ASSAY OF IRON: CPT

## 2025-08-12 PROCEDURE — 83550 IRON BINDING TEST: CPT

## 2025-08-12 ASSESSMENT — PATIENT HEALTH QUESTIONNAIRE - PHQ9
SUM OF ALL RESPONSES TO PHQ QUESTIONS 1-9: 0
2. FEELING DOWN, DEPRESSED OR HOPELESS: NOT AT ALL
1. LITTLE INTEREST OR PLEASURE IN DOING THINGS: NOT AT ALL

## 2025-08-13 LAB
CCP IGA+IGG SERPL IA-ACNC: 6 UNITS (ref 0–19)
CENTROMERE B AB SER-ACNC: <0.2 AI (ref 0–0.9)
CHROMATIN AB SERPL-ACNC: <0.2 AI (ref 0–0.9)
DSDNA AB SER-ACNC: <1 IU/ML (ref 0–9)
ENA JO1 AB SER-ACNC: <0.2 AI (ref 0–0.9)
ENA RNP AB SER-ACNC: <0.2 AI (ref 0–0.9)
ENA SCL70 AB SER-ACNC: <0.2 AI (ref 0–0.9)
ENA SM AB SER-ACNC: <0.2 AI (ref 0–0.9)
ENA SS-A AB SER-ACNC: 1.8 AI (ref 0–0.9)
ENA SS-B AB SER-ACNC: <0.2 AI (ref 0–0.9)
Lab: ABNORMAL
RHEUMATOID FACT SER QL LA: NEGATIVE

## 2025-08-14 LAB
C3 SERPL-MCNC: 139 MG/DL (ref 82–167)
C4 SERPL-MCNC: 21 MG/DL (ref 12–38)

## 2025-08-20 ENCOUNTER — OFFICE VISIT (OUTPATIENT)
Dept: RHEUMATOLOGY | Age: 55
End: 2025-08-20
Payer: COMMERCIAL

## 2025-08-20 VITALS
DIASTOLIC BLOOD PRESSURE: 91 MMHG | WEIGHT: 125 LBS | BODY MASS INDEX: 23 KG/M2 | SYSTOLIC BLOOD PRESSURE: 158 MMHG | HEIGHT: 62 IN | HEART RATE: 77 BPM

## 2025-08-20 DIAGNOSIS — G62.9 NEUROPATHY: ICD-10-CM

## 2025-08-20 DIAGNOSIS — M54.16 RADICULOPATHY, LUMBAR REGION: ICD-10-CM

## 2025-08-20 DIAGNOSIS — Z71.85 VACCINE COUNSELING: ICD-10-CM

## 2025-08-20 DIAGNOSIS — D64.9 ANEMIA, UNSPECIFIED TYPE: ICD-10-CM

## 2025-08-20 DIAGNOSIS — M35.05 SJOGREN'S SYNDROME WITH INFLAMMATORY ARTHRITIS: ICD-10-CM

## 2025-08-20 DIAGNOSIS — E55.9 VITAMIN D DEFICIENCY, UNSPECIFIED: ICD-10-CM

## 2025-08-20 DIAGNOSIS — Z79.60 LONG TERM CURRENT USE OF IMMUNOSUPPRESSIVE DRUG: ICD-10-CM

## 2025-08-20 DIAGNOSIS — Z79.899 HIGH RISK MEDICATION USE: ICD-10-CM

## 2025-08-20 DIAGNOSIS — R76.0 ANTINUCLEAR ANTIBODY (ANA) TITER GREATER THAN 1:80: ICD-10-CM

## 2025-08-20 DIAGNOSIS — Z79.52 LONG TERM (CURRENT) USE OF SYSTEMIC STEROIDS: ICD-10-CM

## 2025-08-20 DIAGNOSIS — R53.83 OTHER FATIGUE: ICD-10-CM

## 2025-08-20 DIAGNOSIS — M06.4 INFLAMMATORY POLYARTHRITIS (HCC): Primary | ICD-10-CM

## 2025-08-20 DIAGNOSIS — M15.9 GENERALIZED OSTEOARTHRITIS: ICD-10-CM

## 2025-08-20 DIAGNOSIS — K21.9 CHRONIC GASTROESOPHAGEAL REFLUX DISEASE: ICD-10-CM

## 2025-08-20 PROCEDURE — 3077F SYST BP >= 140 MM HG: CPT | Performed by: INTERNAL MEDICINE

## 2025-08-20 PROCEDURE — 3080F DIAST BP >= 90 MM HG: CPT | Performed by: INTERNAL MEDICINE

## 2025-08-20 PROCEDURE — 99215 OFFICE O/P EST HI 40 MIN: CPT | Performed by: INTERNAL MEDICINE

## 2025-08-20 PROCEDURE — 96372 THER/PROPH/DIAG INJ SC/IM: CPT | Performed by: INTERNAL MEDICINE

## 2025-08-20 RX ORDER — METHYLPREDNISOLONE SODIUM SUCCINATE 40 MG/ML
100 INJECTION INTRAMUSCULAR; INTRAVENOUS ONCE
Status: COMPLETED | OUTPATIENT
Start: 2025-08-20 | End: 2025-08-20

## 2025-08-20 RX ORDER — FAMOTIDINE 10 MG
10 TABLET ORAL 2 TIMES DAILY
Qty: 180 TABLET | Refills: 1 | Status: SHIPPED | OUTPATIENT
Start: 2025-08-20

## 2025-08-20 RX ORDER — FOLIC ACID 1 MG/1
2 TABLET ORAL DAILY
Qty: 180 TABLET | Refills: 1 | Status: SHIPPED | OUTPATIENT
Start: 2025-08-20

## 2025-08-20 RX ORDER — INFLUENZA A VIRUS A/VICTORIA/4897/2022 IVR-238 (H1N1) ANTIGEN (FORMALDEHYDE INACTIVATED), INFLUENZA A VIRUS A/CALIFORNIA/122/2022 SAN-022 (H3N2) ANTIGEN (FORMALDEHYDE INACTIVATED), AND INFLUENZA B VIRUS B/MICHIGAN/01/2021 ANTIGEN (FORMALDEHYDE INACTIVATED) 60; 60; 60 UG/.5ML; UG/.5ML; UG/.5ML
0.5 INJECTION, SUSPENSION INTRAMUSCULAR ONCE
Qty: 0.5 ML | Refills: 0 | Status: SHIPPED | OUTPATIENT
Start: 2025-08-20 | End: 2025-08-20

## 2025-08-20 RX ORDER — PILOCARPINE HYDROCHLORIDE 5 MG/1
TABLET, FILM COATED ORAL
Qty: 270 TABLET | Refills: 1 | Status: SHIPPED | OUTPATIENT
Start: 2025-08-20

## 2025-08-20 RX ORDER — VENLAFAXINE HYDROCHLORIDE 150 MG/1
150 CAPSULE, EXTENDED RELEASE ORAL DAILY
Qty: 90 CAPSULE | Refills: 1 | Status: SHIPPED | OUTPATIENT
Start: 2025-08-20

## 2025-08-20 RX ORDER — AMLODIPINE BESYLATE 5 MG/1
2.5 TABLET ORAL DAILY
Qty: 45 TABLET | Refills: 1 | Status: SHIPPED | OUTPATIENT
Start: 2025-08-20

## 2025-08-20 RX ORDER — PANTOPRAZOLE SODIUM 40 MG/1
40 TABLET, DELAYED RELEASE ORAL 2 TIMES DAILY
Qty: 180 TABLET | Refills: 1 | Status: SHIPPED | OUTPATIENT
Start: 2025-08-20

## 2025-08-20 RX ORDER — PREDNISONE 5 MG/1
TABLET ORAL
Qty: 90 TABLET | Refills: 1 | Status: SHIPPED | OUTPATIENT
Start: 2025-08-20

## 2025-08-20 RX ORDER — PREGABALIN 100 MG/1
CAPSULE ORAL
Qty: 180 CAPSULE | Refills: 1 | Status: SHIPPED | OUTPATIENT
Start: 2025-08-20 | End: 2026-08-20

## 2025-08-20 RX ADMIN — METHYLPREDNISOLONE SODIUM SUCCINATE 100 MG: 40 INJECTION INTRAMUSCULAR; INTRAVENOUS at 15:08

## 2025-08-27 ENCOUNTER — TELEPHONE (OUTPATIENT)
Dept: RHEUMATOLOGY | Age: 55
End: 2025-08-27